# Patient Record
Sex: FEMALE | Race: WHITE | Employment: FULL TIME | URBAN - METROPOLITAN AREA
[De-identification: names, ages, dates, MRNs, and addresses within clinical notes are randomized per-mention and may not be internally consistent; named-entity substitution may affect disease eponyms.]

---

## 2017-06-30 ENCOUNTER — OFFICE VISIT (OUTPATIENT)
Dept: OBGYN CLINIC | Age: 52
End: 2017-06-30
Payer: COMMERCIAL

## 2017-06-30 ENCOUNTER — HOSPITAL ENCOUNTER (OUTPATIENT)
Age: 52
Setting detail: SPECIMEN
Discharge: HOME | End: 2017-06-30
Payer: COMMERCIAL

## 2017-06-30 VITALS
HEART RATE: 78 BPM | WEIGHT: 140 LBS | HEIGHT: 67 IN | BODY MASS INDEX: 21.97 KG/M2 | DIASTOLIC BLOOD PRESSURE: 76 MMHG | SYSTOLIC BLOOD PRESSURE: 118 MMHG

## 2017-06-30 DIAGNOSIS — Z80.3 FAMILY HISTORY OF BREAST CANCER: ICD-10-CM

## 2017-06-30 DIAGNOSIS — Z11.51 SPECIAL SCREENING EXAMINATION FOR HUMAN PAPILLOMAVIRUS (HPV): ICD-10-CM

## 2017-06-30 DIAGNOSIS — Z98.890 H/O LEEP: ICD-10-CM

## 2017-06-30 DIAGNOSIS — Z01.419 WELL FEMALE EXAM WITH ROUTINE GYNECOLOGICAL EXAM: ICD-10-CM

## 2017-06-30 DIAGNOSIS — Z01.419 WELL FEMALE EXAM WITH ROUTINE GYNECOLOGICAL EXAM: Primary | ICD-10-CM

## 2017-06-30 PROBLEM — Z96.643 H/O BILATERAL HIP REPLACEMENTS: Status: ACTIVE | Noted: 2017-06-30

## 2017-06-30 PROCEDURE — 99386 PREV VISIT NEW AGE 40-64: CPT | Performed by: NURSE PRACTITIONER

## 2017-06-30 PROCEDURE — 87624 HPV HI-RISK TYP POOLED RSLT: CPT

## 2017-06-30 PROCEDURE — G0145 SCR C/V CYTO,THINLAYER,RESCR: HCPCS

## 2017-06-30 RX ORDER — LEVOTHYROXINE SODIUM 0.07 MG/1
75 TABLET ORAL DAILY
COMMUNITY

## 2017-06-30 ASSESSMENT — PATIENT HEALTH QUESTIONNAIRE - PHQ9
1. LITTLE INTEREST OR PLEASURE IN DOING THINGS: 0
2. FEELING DOWN, DEPRESSED OR HOPELESS: 0
SUM OF ALL RESPONSES TO PHQ9 QUESTIONS 1 & 2: 0
SUM OF ALL RESPONSES TO PHQ QUESTIONS 1-9: 0

## 2017-07-03 LAB
HPV SAMPLE: NORMAL
HPV SOURCE: NORMAL
HPV, GENOTYPE 16: NOT DETECTED
HPV, GENOTYPE 18: NOT DETECTED
HPV, HIGH RISK OTHER: NOT DETECTED
HPV, INTERPRETATION: NORMAL

## 2017-07-06 LAB — CYTOLOGY REPORT: NORMAL

## 2017-07-07 ENCOUNTER — HOSPITAL ENCOUNTER (OUTPATIENT)
Dept: WOMENS IMAGING | Age: 52
Discharge: HOME | End: 2017-07-07
Payer: COMMERCIAL

## 2017-07-07 DIAGNOSIS — Z13.9 SCREENING: ICD-10-CM

## 2017-07-07 DIAGNOSIS — Z78.0 POST-MENOPAUSAL: ICD-10-CM

## 2017-07-07 PROCEDURE — 77080 DXA BONE DENSITY AXIAL: CPT

## 2017-07-07 PROCEDURE — 77063 BREAST TOMOSYNTHESIS BI: CPT

## 2018-07-06 ENCOUNTER — OFFICE VISIT (OUTPATIENT)
Dept: OBGYN CLINIC | Age: 53
End: 2018-07-06
Payer: COMMERCIAL

## 2018-07-06 ENCOUNTER — HOSPITAL ENCOUNTER (OUTPATIENT)
Age: 53
Discharge: HOME | End: 2018-07-06
Payer: COMMERCIAL

## 2018-07-06 ENCOUNTER — HOSPITAL ENCOUNTER (OUTPATIENT)
Age: 53
Setting detail: SPECIMEN
Discharge: HOME | End: 2018-07-06
Payer: COMMERCIAL

## 2018-07-06 VITALS
HEIGHT: 67 IN | SYSTOLIC BLOOD PRESSURE: 116 MMHG | DIASTOLIC BLOOD PRESSURE: 68 MMHG | HEART RATE: 80 BPM | BODY MASS INDEX: 22.76 KG/M2 | WEIGHT: 145 LBS

## 2018-07-06 DIAGNOSIS — N95.1 MENOPAUSAL SYMPTOM: ICD-10-CM

## 2018-07-06 DIAGNOSIS — Z11.51 SPECIAL SCREENING EXAMINATION FOR HUMAN PAPILLOMAVIRUS (HPV): ICD-10-CM

## 2018-07-06 DIAGNOSIS — Z01.419 WELL WOMAN EXAM: ICD-10-CM

## 2018-07-06 DIAGNOSIS — Z12.39 BREAST CANCER SCREENING: ICD-10-CM

## 2018-07-06 DIAGNOSIS — N93.9 VAGINAL BLEEDING: ICD-10-CM

## 2018-07-06 DIAGNOSIS — Z01.419 WELL WOMAN EXAM: Primary | ICD-10-CM

## 2018-07-06 LAB
ESTRADIOL LEVEL: 159 PG/ML (ref 5–50)
FOLLICLE STIMULATING HORMONE: 7.7 U/L (ref 25.8–134.8)

## 2018-07-06 PROCEDURE — G0145 SCR C/V CYTO,THINLAYER,RESCR: HCPCS

## 2018-07-06 PROCEDURE — 87624 HPV HI-RISK TYP POOLED RSLT: CPT

## 2018-07-06 PROCEDURE — 36415 COLL VENOUS BLD VENIPUNCTURE: CPT

## 2018-07-06 PROCEDURE — 82670 ASSAY OF TOTAL ESTRADIOL: CPT

## 2018-07-06 PROCEDURE — 83001 ASSAY OF GONADOTROPIN (FSH): CPT

## 2018-07-06 PROCEDURE — 99396 PREV VISIT EST AGE 40-64: CPT | Performed by: NURSE PRACTITIONER

## 2018-07-06 ASSESSMENT — PATIENT HEALTH QUESTIONNAIRE - PHQ9
SUM OF ALL RESPONSES TO PHQ9 QUESTIONS 1 & 2: 0
1. LITTLE INTEREST OR PLEASURE IN DOING THINGS: 0
2. FEELING DOWN, DEPRESSED OR HOPELESS: 0
SUM OF ALL RESPONSES TO PHQ QUESTIONS 1-9: 0

## 2018-07-06 NOTE — PROGRESS NOTES
3. Menopausal symptom  Estradiol    Follicle Stimulating Hormone   4. Vaginal bleeding  Estradiol    Follicle Stimulating Hormone   5. Special screening examination for human papillomavirus (HPV)  PAP SMEAR          Chief Complaint   Patient presents with    Gynecologic Exam          Past Medical History:   Diagnosis Date    Family history of breast cancer     MOM @ 61 Sister @ 48    H/O LEEP 1996    Hypothyroid     Kidney stones          Patient Active Problem List    Diagnosis Date Noted    H/O right hip replacement 06/30/2017 2014      Hypothyroid     Family history of breast cancer      MOM @ 61 Sister @ 48      H/O LEEP 01/01/1996          Hereditary Breast, Ovarian, Colon and Uterine Cancer screening Done. Tobacco & Secondary smoke risks reviewed; instructed on cessation and avoidance      Counseling Completed:  Preventative Health Recommendations and Follow up. The patient was informed of the recommended preventative health recommendations. 1. Annuals every year; Cytology collections per prevailing guidelines. 2. Mammograms begin every year at 35 yo if no abnormalities are found and no family     History. 3. Bone density studies every 2-3 years. Begin at 71 yo. If no fracture history or osteoporosis family history. (significant). 4. Colonoscopy begin at 38 yo. Repeat every ten years if negative and no family history. 5. Calcium of 1896-3213 mg/day in split dosing  6. Vitamin D 400-800 IU/day  7. All other preventative health recommendations will be managed by the patients Primary care physician. PLAN:  Return in about 1 year (around 7/6/2019). Declines [facility] genetic testing. Information given. Mammogram ordered. Lab slips given to check menopausal status. Call for results. Pap smear obtained. Repeat Annual every 1 year  Cervical Cytology Evaluation begins at 24years old. If Negative Cytology, Follow-up screening per current guidelines.    Mammograms

## 2018-07-09 ENCOUNTER — TELEPHONE (OUTPATIENT)
Dept: OBGYN CLINIC | Age: 53
End: 2018-07-09

## 2018-07-13 ENCOUNTER — HOSPITAL ENCOUNTER (OUTPATIENT)
Dept: WOMENS IMAGING | Age: 53
Discharge: HOME | End: 2018-07-15
Payer: COMMERCIAL

## 2018-07-13 DIAGNOSIS — Z12.39 BREAST CANCER SCREENING: ICD-10-CM

## 2018-07-13 DIAGNOSIS — Z01.419 WELL WOMAN EXAM: ICD-10-CM

## 2018-07-13 PROCEDURE — 77067 SCR MAMMO BI INCL CAD: CPT

## 2018-07-19 LAB — CYTOLOGY REPORT: NORMAL

## 2019-07-08 ENCOUNTER — OFFICE VISIT (OUTPATIENT)
Dept: OBGYN CLINIC | Age: 54
End: 2019-07-08
Payer: COMMERCIAL

## 2019-07-08 ENCOUNTER — HOSPITAL ENCOUNTER (OUTPATIENT)
Age: 54
Setting detail: SPECIMEN
Discharge: HOME | End: 2019-07-08
Payer: COMMERCIAL

## 2019-07-08 VITALS
HEIGHT: 66 IN | HEART RATE: 78 BPM | BODY MASS INDEX: 24.27 KG/M2 | WEIGHT: 151 LBS | SYSTOLIC BLOOD PRESSURE: 104 MMHG | DIASTOLIC BLOOD PRESSURE: 68 MMHG

## 2019-07-08 DIAGNOSIS — Z12.31 ENCOUNTER FOR SCREENING MAMMOGRAM FOR BREAST CANCER: ICD-10-CM

## 2019-07-08 DIAGNOSIS — N92.0 MENORRHAGIA WITH REGULAR CYCLE: ICD-10-CM

## 2019-07-08 DIAGNOSIS — Z11.51 SCREENING FOR HUMAN PAPILLOMAVIRUS: ICD-10-CM

## 2019-07-08 DIAGNOSIS — Z01.419 PAP SMEAR, AS PART OF ROUTINE GYNECOLOGICAL EXAMINATION: ICD-10-CM

## 2019-07-08 DIAGNOSIS — Z80.3 FAMILY HISTORY OF BREAST CANCER: ICD-10-CM

## 2019-07-08 DIAGNOSIS — Z01.419 PAP SMEAR, AS PART OF ROUTINE GYNECOLOGICAL EXAMINATION: Primary | ICD-10-CM

## 2019-07-08 LAB
ABSOLUTE EOS #: 0.1 K/UL (ref 0–0.4)
ABSOLUTE IMMATURE GRANULOCYTE: ABNORMAL K/UL (ref 0–0.3)
ABSOLUTE LYMPH #: 1.4 K/UL (ref 1–4.8)
ABSOLUTE MONO #: 0.5 K/UL (ref 0.1–1.3)
BASOPHILS # BLD: 1 % (ref 0–2)
BASOPHILS ABSOLUTE: 0 K/UL (ref 0–0.2)
DIFFERENTIAL TYPE: ABNORMAL
EOSINOPHILS RELATIVE PERCENT: 3 % (ref 0–4)
ESTRADIOL LEVEL: 293 PG/ML (ref 5–50)
FOLLICLE STIMULATING HORMONE: 4.5 U/L (ref 25.8–134.8)
HCG QUANTITATIVE: <1 IU/L
HCT VFR BLD CALC: 39.8 % (ref 36–46)
HEMOGLOBIN: 13.4 G/DL (ref 12–16)
IMMATURE GRANULOCYTES: ABNORMAL %
INR BLD: 0.9
LYMPHOCYTES # BLD: 27 % (ref 24–44)
MCH RBC QN AUTO: 31.8 PG (ref 26–34)
MCHC RBC AUTO-ENTMCNC: 33.7 G/DL (ref 31–37)
MCV RBC AUTO: 94.5 FL (ref 80–100)
MONOCYTES # BLD: 9 % (ref 1–7)
NRBC AUTOMATED: ABNORMAL PER 100 WBC
PARTIAL THROMBOPLASTIN TIME: 29.5 SEC (ref 24–36)
PDW BLD-RTO: 13.9 % (ref 11.5–14.9)
PLATELET # BLD: 247 K/UL (ref 150–450)
PLATELET ESTIMATE: ABNORMAL
PMV BLD AUTO: 10.1 FL (ref 6–12)
PROTHROMBIN TIME: 12.6 SEC (ref 11.8–14.6)
RBC # BLD: 4.21 M/UL (ref 4–5.2)
RBC # BLD: ABNORMAL 10*6/UL
SEG NEUTROPHILS: 60 % (ref 36–66)
SEGMENTED NEUTROPHILS ABSOLUTE COUNT: 3.2 K/UL (ref 1.3–9.1)
TSH SERPL DL<=0.05 MIU/L-ACNC: 3.66 MIU/L (ref 0.3–5)
WBC # BLD: 5.3 K/UL (ref 3.5–11)
WBC # BLD: ABNORMAL 10*3/UL

## 2019-07-08 PROCEDURE — 99396 PREV VISIT EST AGE 40-64: CPT | Performed by: NURSE PRACTITIONER

## 2019-07-08 PROCEDURE — 84702 CHORIONIC GONADOTROPIN TEST: CPT

## 2019-07-08 PROCEDURE — 36415 COLL VENOUS BLD VENIPUNCTURE: CPT

## 2019-07-08 PROCEDURE — 82670 ASSAY OF TOTAL ESTRADIOL: CPT

## 2019-07-08 PROCEDURE — 84443 ASSAY THYROID STIM HORMONE: CPT

## 2019-07-08 PROCEDURE — 85610 PROTHROMBIN TIME: CPT

## 2019-07-08 PROCEDURE — 85025 COMPLETE CBC W/AUTO DIFF WBC: CPT

## 2019-07-08 PROCEDURE — 87624 HPV HI-RISK TYP POOLED RSLT: CPT

## 2019-07-08 PROCEDURE — 85730 THROMBOPLASTIN TIME PARTIAL: CPT

## 2019-07-08 PROCEDURE — 83001 ASSAY OF GONADOTROPIN (FSH): CPT

## 2019-07-08 PROCEDURE — G0145 SCR C/V CYTO,THINLAYER,RESCR: HCPCS

## 2019-07-08 ASSESSMENT — PATIENT HEALTH QUESTIONNAIRE - PHQ9
1. LITTLE INTEREST OR PLEASURE IN DOING THINGS: 0
2. FEELING DOWN, DEPRESSED OR HOPELESS: 0
SUM OF ALL RESPONSES TO PHQ9 QUESTIONS 1 & 2: 0
SUM OF ALL RESPONSES TO PHQ QUESTIONS 1-9: 0
SUM OF ALL RESPONSES TO PHQ QUESTIONS 1-9: 0

## 2019-07-08 NOTE — PROGRESS NOTES
Hematuria or Nocturia. No Urinary Incontinence or Vaginal Discharge. + heavy, painful menses  Musculoskeletal ROS: No Arthralgia, Arthritis,Gout,Osteoporosis or Rheumatism  Neurological ROS: No CVA, Migraines, Epilepsy, Seizure Hx, or Limb Weakness. + hypothyroidism  Dermatological ROS: No Rash, Itching, Hives, Mole Changes or Cancer                                                                                                                                                                                                                                  PHYSICAL Exam:     Constitutional:  Vitals:    07/08/19 0805   BP: 104/68   Site: Right Upper Arm   Position: Sitting   Cuff Size: Medium Adult   Pulse: 78   Weight: 151 lb (68.5 kg)   Height: 5' 6\" (1.676 m)         General Appearance: This  is a well Developed, well Nourished, well groomed female. Her BMI was reviewed. Nutritional decision making was discussed. Skin:  There was a Normal Inspection of the skin without rashes or lesions. There were no rashes. (Papular, Maculopapular, Hives, Pustular, Macular)     There were no lesions (Ulcers, Erythema, Abn. Appearing Nevi)            Lymphatic:  No Lymph Nodes were Palpable in the neck , axilla or groin.  0 # Of Lymph Nodes; Location ; Character [Normal]  [Shotty] [Tender] [Enlarged]     Neck and EENT:  The neck was supple. There were no masses   The thyroid was not enlarged and had no masses. Perrla, EOMI B/L, TMI B/L No Abnormalities. Throat inspected-No exudates or Masses, Nares Patent No Masses        Respiratory: The lungs were auscultated and found to be clear. There were no rales, rhonchi or wheezes. There was a good respiratory effort. Cardiovascular: The heart was in a regular rate and rhythm. . No S3 or S4. There was no murmur appreciated. Location, grade, and radiation are not applicable. Extremities:   The patients extremities were without calf tenderness, edema, or varicosities. There was full range of motion in all four extremities. Pulses in all four extremities were appreciated and are 2/4. Abdomen: The abdomen was soft and non-tender. There were good bowel sounds in all quadrants and there was no guarding, rebound or rigidity. On evaluation there was no evidence of hepatosplenomegaly and there was no costal vertebral marquita tenderness bilaterally. No hernias were appreciated. Abdominal Scars: intact    Psych: The patient had a normal Orientation to: Time, Place, Person, and Situation  There is no Mood / Affect changes    Breast:  (Chest)  normal appearance, no masses or tenderness  Self breast exams were reviewed in detail. Literature was given. Pelvic Exam:  Vulva and vagina appear normal. Bimanual exam reveals normal uterus and adnexa. Rectal Exam:  exam declined by patient          Musculosk:  Normal Gait and station was noted. Digits were evaluated without abnormal findings. Range of motion, stability and strength were evaluated and found to be appropriate for the patients age. ASSESSMENT:      62 y.o. Annual   Diagnosis Orders   1. Pap smear, as part of routine gynecological examination  PAP SMEAR   2. Encounter for screening mammogram for breast cancer  MALIA DIGITAL SCREEN W CAD BILATERAL   3. Screening for human papillomavirus  PAP SMEAR   4. Menorrhagia with regular cycle  US Pelvis Complete    US Non OB Transvaginal    CBC Auto Differential    TSH with Reflex    HCG, Quantitative, Pregnancy    Protime-INR    APTT    Follicle Stimulating Hormone    Estradiol   5.  Family history of breast cancer  [address], [facility]          Chief Complaint   Patient presents with    Annual Exam          Past Medical History:   Diagnosis Date    Family history of breast cancer     MOM @ 61 Sister @ 48    H/O LEECONOR 1996    Hypothyroid     Kidney stones          Patient Active Problem List    Diagnosis Date Noted    H/O onset of her antibiotic dosing and through a minimum of thirty days from antibiotic cessation. The life threatening side effect profile was reviewed in detail this includes but is not limited to shortness of breath, chest pain, severe or persistent headaches, or calf pain. If any of these occur the patient has been instructed to stop using her hormonal based contraception, notify the office, and go to the emergency department or call 911. The patient denied any personal history of blood clots in her leg, lung, or heart and denied any family history of stroke, TIA, sudden cardiac death < 36 y.o.,pulmonary embolism, or deep venous thrombosis. PLAN:  Return in about 1 month (around 8/5/2019) for physician/ heavy menses. Pap smear obtained. Mammogram ordered. Pelvic ultrasound ordered. Lab slips given. Repeat Annual every 1 year  Cervical Cytology Evaluation begins at 24years old. If Negative Cytology, Follow-up screening per current guidelines. Mammograms every 1 year. If 35 yo and last mammogram was negative. Calcium and Vitamin D dosing reviewed. Colonoscopy screening reviewed as well as onset for bone density testing. Birth control and barrier recommendations discussed. STD counseling and prevention reviewed. Gardisil counseling completed for all patients 10-35 yo. Routine health maintenance per patients PCP.   Orders Placed This Encounter   Procedures    MALIA DIGITAL SCREEN W CAD BILATERAL     Standing Status:   Future     Standing Expiration Date:   9/4/2020     Order Specific Question:   Reason for exam:     Answer:   screening for breast cancer    US Pelvis Complete     Standing Status:   Future     Standing Expiration Date:   10/8/2019     Order Specific Question:   Reason for exam:     Answer:   heavy menses    US Non OB Transvaginal     Standing Status:   Future     Standing Expiration Date:   1/8/2020     Order Specific Question:   Reason for exam:     Answer:   heavy periods   

## 2019-07-11 LAB
CYTOLOGY REPORT: NORMAL
HPV SAMPLE: NORMAL
HPV, GENOTYPE 16: NOT DETECTED
HPV, GENOTYPE 18: NOT DETECTED
HPV, HIGH RISK OTHER: NOT DETECTED
HPV, INTERPRETATION: NORMAL
SPECIMEN DESCRIPTION: NORMAL

## 2019-07-15 ENCOUNTER — OFFICE VISIT (OUTPATIENT)
Dept: OBGYN CLINIC | Age: 54
End: 2019-07-15
Payer: COMMERCIAL

## 2019-07-15 DIAGNOSIS — N92.0 MENORRHAGIA WITH REGULAR CYCLE: ICD-10-CM

## 2019-07-15 PROCEDURE — 76856 US EXAM PELVIC COMPLETE: CPT | Performed by: OBSTETRICS & GYNECOLOGY

## 2019-07-16 ENCOUNTER — HOSPITAL ENCOUNTER (OUTPATIENT)
Dept: WOMENS IMAGING | Age: 54
Discharge: HOME | End: 2019-07-18
Payer: COMMERCIAL

## 2019-07-16 DIAGNOSIS — Z12.31 ENCOUNTER FOR SCREENING MAMMOGRAM FOR BREAST CANCER: ICD-10-CM

## 2019-07-16 PROCEDURE — 77063 BREAST TOMOSYNTHESIS BI: CPT

## 2019-07-17 ENCOUNTER — TELEPHONE (OUTPATIENT)
Dept: OBGYN CLINIC | Age: 54
End: 2019-07-17

## 2019-07-17 DIAGNOSIS — R92.2 DENSE BREAST TISSUE ON MAMMOGRAM: ICD-10-CM

## 2019-07-17 DIAGNOSIS — Z12.39 BREAST CANCER SCREENING, HIGH RISK PATIENT: Primary | ICD-10-CM

## 2019-07-17 NOTE — TELEPHONE ENCOUNTER
----- Message from LEON Zapien CNP sent at 7/17/2019  4:08 PM EDT -----  Thickened endometrial lining. Suspected fibroid on uterus. Recommend GYN follow up with endometrial sampling with directed visualization- possible myosure resection. Please let patient know results and encourage patient to keep follow up with physician.

## 2019-07-17 NOTE — TELEPHONE ENCOUNTER
Pt notified of normal mammogram.  Patient advised to do MRI of breasts in 6 months due to high risk status. Order in [address] Patient also agreeable to referral to high risk Breast Clinic for genetic counseling and oncology consultation due to Tyrer-Cuzick risk score of 22.9%. Referral to high risk breast clinic given to be done by  staff. Patient to notify our office if she has not been contacted by high risk breast clinic in one week.   Pt verbalized understanding of above orders and was aggreable to plan of care

## 2019-07-22 ENCOUNTER — HOSPITAL ENCOUNTER (OUTPATIENT)
Dept: WOMENS IMAGING | Age: 54
Discharge: HOME | End: 2019-07-24
Payer: COMMERCIAL

## 2019-07-22 DIAGNOSIS — Z78.0 POST-MENOPAUSAL: ICD-10-CM

## 2019-07-22 PROCEDURE — 77080 DXA BONE DENSITY AXIAL: CPT

## 2019-07-23 DIAGNOSIS — Z80.3 FAMILY HISTORY OF BREAST CANCER: Primary | ICD-10-CM

## 2019-07-23 DIAGNOSIS — R92.2 DENSE BREAST TISSUE: ICD-10-CM

## 2019-08-06 ENCOUNTER — INITIAL CONSULT (OUTPATIENT)
Dept: ONCOLOGY | Age: 54
End: 2019-08-06
Payer: COMMERCIAL

## 2019-08-06 ENCOUNTER — OFFICE VISIT (OUTPATIENT)
Dept: OBGYN CLINIC | Age: 54
End: 2019-08-06
Payer: COMMERCIAL

## 2019-08-06 ENCOUNTER — TELEPHONE (OUTPATIENT)
Dept: ONCOLOGY | Age: 54
End: 2019-08-06

## 2019-08-06 VITALS
WEIGHT: 145.1 LBS | TEMPERATURE: 97.8 F | DIASTOLIC BLOOD PRESSURE: 69 MMHG | HEIGHT: 66 IN | SYSTOLIC BLOOD PRESSURE: 112 MMHG | RESPIRATION RATE: 18 BRPM | HEART RATE: 57 BPM | BODY MASS INDEX: 23.32 KG/M2

## 2019-08-06 VITALS
HEIGHT: 66 IN | HEART RATE: 60 BPM | SYSTOLIC BLOOD PRESSURE: 112 MMHG | BODY MASS INDEX: 23.3 KG/M2 | WEIGHT: 145 LBS | DIASTOLIC BLOOD PRESSURE: 60 MMHG

## 2019-08-06 DIAGNOSIS — R10.2 PELVIC PRESSURE IN FEMALE: ICD-10-CM

## 2019-08-06 DIAGNOSIS — D21.9 FIBROIDS: ICD-10-CM

## 2019-08-06 DIAGNOSIS — Z80.3 FAMILY HISTORY OF BREAST CANCER: ICD-10-CM

## 2019-08-06 DIAGNOSIS — R93.89 ENDOMETRIAL THICKENING ON ULTRASOUND: ICD-10-CM

## 2019-08-06 DIAGNOSIS — R10.2 PELVIC PAIN IN FEMALE: ICD-10-CM

## 2019-08-06 DIAGNOSIS — N92.0 MENORRHAGIA WITH REGULAR CYCLE: Primary | ICD-10-CM

## 2019-08-06 DIAGNOSIS — Z80.3 FAMILY HISTORY OF BREAST CANCER: Primary | ICD-10-CM

## 2019-08-06 DIAGNOSIS — N85.2 BULKY OR ENLARGED UTERUS: ICD-10-CM

## 2019-08-06 PROCEDURE — 99214 OFFICE O/P EST MOD 30 MIN: CPT | Performed by: OBSTETRICS & GYNECOLOGY

## 2019-08-06 PROCEDURE — 99244 OFF/OP CNSLTJ NEW/EST MOD 40: CPT | Performed by: INTERNAL MEDICINE

## 2019-08-06 PROCEDURE — 96040 PR GENETIC COUNSELING, EACH 30 MIN: CPT | Performed by: GENETIC COUNSELOR, MS

## 2019-08-06 PROCEDURE — 99201 HC NEW PT, E/M LEVEL 1: CPT | Performed by: INTERNAL MEDICINE

## 2019-08-06 NOTE — PROGRESS NOTES
genetic testing should be offered to their children. DISCUSSION  We discussed that the BRCA1/2 genes are the most common genes associated with hereditary breast and ovarian cancer. We also discussed that genetic testing is available for multiple other genes related to hereditary cancer. Some of these genes are known to carry a significant increased risk for several cancers including colon, breast, uterine, ovarian, stomach, and pancreatic cancer, while some of these genes are believed to have a moderate increased risk for breast and other cancers. We discussed the possibility of finding a mutation in genes with limited information to guide medical management, as well we as the possibility of identifying variants of uncertain significance (VUS). We discussed the risks, benefits, and limitations of genetic testing. Possible test results were discussed as well as potential screening and prevention strategies. Specifically, we discussed increased breast cancer surveillance by mammogram and breast MRI as well as the option of prophylactic mastectomy. We discussed the recommendation for prophylactic oophorectomy for results which suggest an increased risk for ovarian cancer. Lastly, we discussed that the results of Ms. Sanchez's genetic testing may be beneficial in defining her risk for cancer as well as for her family members. SUMMARY & PLAN  1) Ms. Mandi Esqueda does not currently meet the NCCN criteria or her insurance criteria for genetic testing. 2) Genetic testing for the BRCA1/2 genes along with other genes associated with hereditary cancer was offered to Ms. Mandi Esqueda. Since testing would not be covered by insurance, it would be offered to her for a discounted self pay rate. Ms. Mandi Esqueda declines genetic testing at this time. 3) Ms. Mandi Esqueda will continue breast cancer screening and risk reduction as directed by her physicians.  Her lifetime risk for breast cancer was estimated at 22.9% according to

## 2019-08-06 NOTE — PROGRESS NOTES
follow-up is recommended in 12 months. OVERALL ASSESSMENT - BENIGN A letter of notification will be sent to the patient regarding the results. The [employer] of Radiology recommends annual mammograms for women 40 years and older. Lab Results:  Results for orders placed or performed during the hospital encounter of 07/08/19   Estradiol   Result Value Ref Range    Estradiol 293 (H) 5 - 50 pg/mL   Follicle Stimulating Hormone   Result Value Ref Range    FSH 4.5 (L) 25.8 - 134.8 U/L   APTT   Result Value Ref Range    PTT 29.5 24.0 - 36.0 sec   Protime-INR   Result Value Ref Range    Protime 12.6 11.8 - 14.6 sec    INR 0.9    HCG, Quantitative, Pregnancy   Result Value Ref Range    hCG Quant <1 <5 IU/L   TSH with Reflex   Result Value Ref Range    TSH 3.66 0.30 - 5.00 mIU/L   CBC Auto Differential   Result Value Ref Range    WBC 5.3 3.5 - 11.0 k/uL    RBC 4.21 4.0 - 5.2 m/uL    Hemoglobin 13.4 12.0 - 16.0 g/dL    Hematocrit 39.8 36 - 46 %    MCV 94.5 80 - 100 fL    MCH 31.8 26 - 34 pg    MCHC 33.7 31 - 37 g/dL    RDW 13.9 11.5 - 14.9 %    Platelets 328 174 - 897 k/uL    MPV 10.1 6.0 - 12.0 fL    NRBC Automated NOT REPORTED per 100 WBC    Differential Type NOT REPORTED     Seg Neutrophils 60 36 - 66 %    Lymphocytes 27 24 - 44 %    Monocytes 9 (H) 1 - 7 %    Eosinophils % 3 0 - 4 %    Basophils 1 0 - 2 %    Immature Granulocytes NOT REPORTED 0 %    Segs Absolute 3.20 1.3 - 9.1 k/uL    Absolute Lymph # 1.40 1.0 - 4.8 k/uL    Absolute Mono # 0.50 0.1 - 1.3 k/uL    Absolute Eos # 0.10 0.0 - 0.4 k/uL    Basophils # 0.00 0.0 - 0.2 k/uL    Absolute Immature Granulocyte NOT REPORTED 0.00 - 0.30 k/uL    WBC Morphology NOT REPORTED     RBC Morphology NOT REPORTED     Platelet Estimate NOT REPORTED    GYN Cytology   Result Value Ref Range    Cytology Report       [id number]  [employer]  [facility] [place]  ANATOMIC PATHOLOGY  [address] [place] [address],  [address].   [employer], [address]  [phone]  Fax: encounter diagnosis was Menorrhagia with regular cycle. Diagnoses of Family history of breast cancer, Fibroids, Pelvic pressure in female, Pelvic pain in female, Endometrial thickening on ultrasound, and Bulky or enlarged uterus were also pertinent to this visit. and Follow-up   as well as  counseling on preventative health maintenance follow-up.

## 2019-08-06 NOTE — LETTER
Marquis Chau MD    2019     [place] [address] [address]    Dear Doctors : Thank you for referring Martinez Walden, 1965, to me for evaluation. Below are the relevant portions of my assessment and plan of care. BRIEF CASE HISTORY: Martinez Walden is a very pleasant 62 y.o. female who is referred to us for consultation for the high risk  breast cancer clinic. She is evaluated by myself and the genetic counselor. She does not have any personal history of breast cancer but she has significant family history. She underwent screening mammogram and during the screen, she took the [employer] high risk questionnaire. The questionnaire showed that her lifetime risk of breast cancer is 22.9%, which falls into the high risk category. She is referred to us for counseling, discussion of risk reduction modalities as well as genetic evaluation. She had breast lump removed from the right breast in  that was benign. She had yearly breast exam 2019 and it was negative. She has hypothyroidism to which she takes Synthroid. GYN history   Menarche: 11  Menopause age:  Still premenopausal.   HRT  None     4  Para 4  Breast Bx one in  (benign)    Age of first life birth  30  PAST MEDICAL HISTORY: has a past medical history of Family history of breast cancer, H/O LEEP, Hypothyroid, and Kidney stones. PAST SURGICAL HISTORY: has a past surgical history that includes joint replacement (Right); LEEP; and Breast lumpectomy (Right, ). CURRENT MEDICATIONS:  has a current medication list which includes the following prescription(s): calcium and levothyroxine. ALLERGIES:  has No Known Allergies. FAMILY HISTORY: detailed family history was obtained, and reviewed, it is detailed in the genetic counselor note. SOCIAL HISTORY:  reports that she quit smoking about 35 years ago.  Her Based on the [employer] [place]) model, this patient's lifetime risk for   developing breast cancer is 22.9%.  The American Cancer society considers   women with a 20% or greater lifetime risk for developing breast cancer as   \"high risk\". Women at high risk may benefit from additional screening, which   may include an annual screening mammogram alternating every six (6) months   with a breast MRI, as appropriate.       Recommend this patient consult with her preferred provider about: a Genetic   Counseling Consultation to discuss formal risk assessment; and a Medical   Oncology Consultation to discuss risk reduction strategies. Assistance, if   requested, is available through the [address]   at [phone] or by placing an [facility] Ambulatory referral to the [facility]   Breast Clinic\".       BIRADS:   BIRADS - CATEGORY 2         IMPRESSION:   1. Elevated lifetime risk of breast cancer based on the [address], life time risk in 22.9%  2. History of hypothyroidism  PLAN:   I had a very long discussion with the patient, explaining the [place] model and the risk factor that led to hair falling into the high risk category. Lifetime risk of developing breast cancer in average [facility] woman ranges between 10% and 12%. So her lifetime risk is almost double the normal.   Various governing bodies in the oncology world have determined that lifetime risk of breast cancer above 20% constitutes a group of women who are eligible for enhanced surveillance and should be counseled about genetic breast cancer.   Also guidelines determined that this group of women are eligible for breast cancer reduction modalities such as the use of tamoxifen based on the NSABP study of tamoxifen and raloxifene and breast cancer (STAR trial) her tamoxifen was able to decrease the discomfort risk cancer by 50%  Side effects of tamoxifen were explained in detail including hot flashes,

## 2019-08-22 ENCOUNTER — PROCEDURE VISIT (OUTPATIENT)
Dept: OBGYN CLINIC | Age: 54
End: 2019-08-22
Payer: COMMERCIAL

## 2019-08-22 ENCOUNTER — HOSPITAL ENCOUNTER (OUTPATIENT)
Age: 54
Setting detail: SPECIMEN
Discharge: HOME | End: 2019-08-22
Payer: COMMERCIAL

## 2019-08-22 VITALS
HEART RATE: 69 BPM | BODY MASS INDEX: 22.66 KG/M2 | WEIGHT: 141 LBS | DIASTOLIC BLOOD PRESSURE: 74 MMHG | HEIGHT: 66 IN | SYSTOLIC BLOOD PRESSURE: 128 MMHG

## 2019-08-22 DIAGNOSIS — N85.2 BULKY OR ENLARGED UTERUS: ICD-10-CM

## 2019-08-22 DIAGNOSIS — Z80.3 FAMILY HISTORY OF BREAST CANCER: ICD-10-CM

## 2019-08-22 DIAGNOSIS — Z32.02 NEGATIVE PREGNANCY TEST: ICD-10-CM

## 2019-08-22 DIAGNOSIS — R10.2 PELVIC PRESSURE IN FEMALE: ICD-10-CM

## 2019-08-22 DIAGNOSIS — N92.0 MENORRHAGIA WITH REGULAR CYCLE: ICD-10-CM

## 2019-08-22 DIAGNOSIS — R93.89 ENDOMETRIAL THICKENING ON ULTRASOUND: Primary | ICD-10-CM

## 2019-08-22 DIAGNOSIS — R10.2 PELVIC PAIN IN FEMALE: ICD-10-CM

## 2019-08-22 DIAGNOSIS — Z98.890 H/O LEEP: ICD-10-CM

## 2019-08-22 DIAGNOSIS — D21.9 FIBROIDS: ICD-10-CM

## 2019-08-22 LAB
CONTROL: NORMAL
PREGNANCY TEST URINE, POC: NEGATIVE

## 2019-08-22 PROCEDURE — 58558 HYSTEROSCOPY BIOPSY: CPT | Performed by: OBSTETRICS & GYNECOLOGY

## 2019-08-22 PROCEDURE — 88305 TISSUE EXAM BY PATHOLOGIST: CPT

## 2019-08-22 PROCEDURE — 81025 URINE PREGNANCY TEST: CPT | Performed by: OBSTETRICS & GYNECOLOGY

## 2019-08-26 LAB — SURGICAL PATHOLOGY REPORT: NORMAL

## 2019-08-27 ENCOUNTER — TELEPHONE (OUTPATIENT)
Dept: OBGYN CLINIC | Age: 54
End: 2019-08-27

## 2019-08-27 NOTE — TELEPHONE ENCOUNTER
----- Message from Delta LEON Akins - CNP sent at 8/26/2019  5:35 PM EDT -----  Negative endometrial biopsy. According to Dr Solomon Interiano notes, patient considering total abdominal hyst.  Please review with DR Ella Ayala and Tere Perdue to see if surgery date is set and scheduled patient for follow up accordingly.

## 2019-11-19 ENCOUNTER — PREP FOR PROCEDURE (OUTPATIENT)
Dept: OBGYN CLINIC | Age: 54
End: 2019-11-19

## 2019-11-19 ENCOUNTER — OFFICE VISIT (OUTPATIENT)
Dept: OBGYN CLINIC | Age: 54
End: 2019-11-19
Payer: COMMERCIAL

## 2019-11-19 VITALS
HEART RATE: 67 BPM | WEIGHT: 145 LBS | SYSTOLIC BLOOD PRESSURE: 100 MMHG | HEIGHT: 67 IN | BODY MASS INDEX: 22.76 KG/M2 | DIASTOLIC BLOOD PRESSURE: 70 MMHG

## 2019-11-19 DIAGNOSIS — N92.0 MENORRHAGIA WITH REGULAR CYCLE: Primary | ICD-10-CM

## 2019-11-19 DIAGNOSIS — D21.9 FIBROIDS: ICD-10-CM

## 2019-11-19 DIAGNOSIS — Z98.890 H/O LEEP: ICD-10-CM

## 2019-11-19 DIAGNOSIS — N85.2 BULKY OR ENLARGED UTERUS: ICD-10-CM

## 2019-11-19 DIAGNOSIS — E03.9 ACQUIRED HYPOTHYROIDISM: ICD-10-CM

## 2019-11-19 DIAGNOSIS — R10.2 PELVIC PAIN IN FEMALE: ICD-10-CM

## 2019-11-19 DIAGNOSIS — N94.10 DYSPAREUNIA, FEMALE: ICD-10-CM

## 2019-11-19 DIAGNOSIS — Z01.818 PREOP TESTING: Primary | ICD-10-CM

## 2019-11-19 DIAGNOSIS — R10.2 PELVIC PRESSURE IN FEMALE: ICD-10-CM

## 2019-11-19 PROCEDURE — 99213 OFFICE O/P EST LOW 20 MIN: CPT | Performed by: OBSTETRICS & GYNECOLOGY

## 2019-11-19 RX ORDER — SODIUM CHLORIDE, SODIUM LACTATE, POTASSIUM CHLORIDE, CALCIUM CHLORIDE 600; 310; 30; 20 MG/100ML; MG/100ML; MG/100ML; MG/100ML
INJECTION, SOLUTION INTRAVENOUS CONTINUOUS
Status: CANCELLED | OUTPATIENT
Start: 2019-11-19

## 2019-11-19 RX ORDER — SODIUM CHLORIDE 0.9 % (FLUSH) 0.9 %
10 SYRINGE (ML) INJECTION EVERY 12 HOURS SCHEDULED
Status: CANCELLED | OUTPATIENT
Start: 2019-11-19

## 2019-11-19 RX ORDER — SODIUM CHLORIDE 0.9 % (FLUSH) 0.9 %
10 SYRINGE (ML) INJECTION PRN
Status: CANCELLED | OUTPATIENT
Start: 2019-11-19

## 2019-11-19 NOTE — H&P (VIEW-ONLY)
Allergies)           REVIEW OF SYSTEMS:      General appearance:Appears healthy. Alert; in no acute distress. Pleasant. HEENT: Glasses or contacts no  CARDIOVASCULAR: Denies: chest pain, dyspnea on exertion, palpitations  RESPIRATORY: Denies: cough, shortness of breath, wheezing  GI: Denies: abdominal pain, flank pain, nausea, vomiting, diarrhea  : Denies: dysuria, frequency/urgency, hematuria, pelvic pain  MUSCULOSKELETAL: Denies: back pain, joint swelling, muscle pain  SKIN: Denies: rash, hives. HEMATOLOGIC: Denies Bleeding Disorder, Coagulopathy or Transfusion  NEUROLOGIC: Denies Migraines, Headaches, CVA, TIA  ENDOCRINE: +HYPOTHROIDISM ON MEDS                PHYSICAL EXAM:  Blood pressure 100/70, pulse 67, height 5' 7\" (1.702 m), weight 145 lb (65.8 kg), not currently breastfeeding. General Appearance:  awake, alert, oriented, in no acute distress, well developed, well nourished, in no acute distress   Skin:  Skin color, texture, turgor normal. No rashes or lesions  Mouth/Throat:  Mucosa moist.  No lesions. Pharynx without erythema, edema or exudate. Lungs:  Normal expansion. Clear to auscultation. No rales, rhonchi, or wheezing. Heart:  Heart sounds are normal.  Regular rate and rhythm without murmur, gallop or rub. Breast:  Inspection negative. No nipple discharge or bleeding. No palpable mass  Abdomen:  Soft, non-tender, normal bowel sounds. No bruits, organomegaly or masses. Extremities: Extremities warm to touch, pink, with no edema. Musculoskeletal:  + Right hip replacement  Peripheral Pulses:  Normal  Neurologic:  Gait normal. Reflexes normal and symmetric. Sensation grossly intact. Female Urogen:  declined   Female Rectal: Declined    OMM Structural Component:  The patient did not complain of a Chief complaint requiring OMM.   Chief Complaint:none    Structural Exam: No Interest              Diagnostics:  Us Non Ob Transvaginal     Result Date: 7/15/2019  GYNECOLOGY ULTRASOUND REPORT mIU/L   CBC Auto Differential   Result Value Ref Range     WBC 5.3 3.5 - 11.0 k/uL     RBC 4.21 4.0 - 5.2 m/uL     Hemoglobin 13.4 12.0 - 16.0 g/dL     Hematocrit 39.8 36 - 46 %     MCV 94.5 80 - 100 fL     MCH 31.8 26 - 34 pg     MCHC 33.7 31 - 37 g/dL     RDW 13.9 11.5 - 14.9 %     Platelets 590 928 - 750 k/uL     MPV 10.1 6.0 - 12.0 fL     NRBC Automated NOT REPORTED per 100 WBC     Differential Type NOT REPORTED       Seg Neutrophils 60 36 - 66 %     Lymphocytes 27 24 - 44 %     Monocytes 9 (H) 1 - 7 %     Eosinophils % 3 0 - 4 %     Basophils 1 0 - 2 %     Immature Granulocytes NOT REPORTED 0 %     Segs Absolute 3.20 1.3 - 9.1 k/uL     Absolute Lymph # 1.40 1.0 - 4.8 k/uL     Absolute Mono # 0.50 0.1 - 1.3 k/uL     Absolute Eos # 0.10 0.0 - 0.4 k/uL     Basophils # 0.00 0.0 - 0.2 k/uL     Absolute Immature Granulocyte NOT REPORTED 0.00 - 0.30 k/uL     WBC Morphology NOT REPORTED       RBC Morphology NOT REPORTED       Platelet Estimate NOT REPORTED     GYN Cytology   Result Value Ref Range     Cytology Report           [id number]  [facility]  [facility]  ANATOMIC PATHOLOGY  [address] [place] [address]. [place], [address]  [phone]  Fax: [phone]  GYNECOLOGIC CYTOLOGY REPORT     Patient Name: Salvador Fierro  MR#: [id number]  Specimen #[id number]  Source:  1: Cervical material, (ThinPrep vial, Imaging-assisted review)     Clinical History  LEEP  Z01.419 Routine gyn exam without abnormal findings  Z11.51 Encounter for screening for HPV  Co-Test:  ThinPrep Pap with high risk HPV testing  LMP:  6/27/19  INTERPRETATION     Cervical material, (ThinPrep vial, Imaging-assisted review):  Specimen Adequacy:       Satisfactory for evaluation.       -Endocervical/transformation zone component is absent.   Descriptive Diagnosis:       Negative for intraepithelial lesion or malignancy.   Comments:       High Risk HPV testing was ordered.        Cytotechnologist: Nathaniel Baer [phone]  SURGICAL PATHOLOGY REPORT    Patient Name: Leia Blue  MR#: [id number]  Specimen #[id number]       Final Diagnosis  ENDOMETRIUM, BIOPSY:         WEAKLY PROLIFERATIVE/INACTIVE-TYPE ENDOMETRIUM    FRAGMENTS OF BENIGN ENDOCERVICAL MUCOSA      Jesus Jones. Nico Kendrick D.O.  **Electronically Signed Out**         [facility]/8/26/2019    Clinical Information  Endometrium thickening on ultrasound. No formalin time on surgical  slip. Source:  A: Endometrial biopsy    Gross Description  Received in formalin labeled \"EMB\" are portions of pink soft tissue  and mucoid material aggregating to 2 x 1.2 x 0.3 cm. The specimen is  entirely submitted in a single cassette. Microscopic Description  Microscopic examination of 2 H&E slides confirms the diagnosis. Assessment:     Diagnosis Orders   1. Menorrhagia with regular cycle     2. Fibroids     3. Pelvic pressure in female     4. Pelvic pain in female     5. Bulky or enlarged uterus     6. H/O LEEP     7. Acquired hypothyroidism     8. Dyspareunia, female         Patient Active Problem List    Diagnosis Date Noted    H/O right hip replacement 06/30/2017     Overview Note:     2014      Hypothyroid     Family history of breast cancer      Overview Note:     MOM @ 61 Sister @ 48      H/O LEEP 01/01/1996             Plan:  1. ANDREY BSO   2. Possible Enterocele        No orders of the defined types were placed in this encounter. Counseling: The patient was counseled on all options both medical and surgical, conservative as well as definitive. She has elected to proceed with the procedure as stated above. The patient was counseled on the procedure. Risks and complications were reviewed in detail. The patients orders, labs, consents have been completed. The history and physical as well as all supporting surgical documentation will be forwarded to the pre-operative holding area.     The patient is aware that this procedure may not alleviate her symptoms. That there may be a necessity for a second surgery and that there may be an incomplete removal of abnormal tissue.                    ________________________________________D. O.  Date:_______________  Adam Aguilar DO

## 2019-11-25 ENCOUNTER — HOSPITAL ENCOUNTER (OUTPATIENT)
Dept: PREADMISSION TESTING | Age: 54
Discharge: HOME | End: 2019-11-29
Payer: COMMERCIAL

## 2019-11-25 VITALS
BODY MASS INDEX: 22.76 KG/M2 | WEIGHT: 145 LBS | DIASTOLIC BLOOD PRESSURE: 60 MMHG | HEIGHT: 67 IN | HEART RATE: 66 BPM | SYSTOLIC BLOOD PRESSURE: 102 MMHG | RESPIRATION RATE: 16 BRPM | TEMPERATURE: 97.9 F | OXYGEN SATURATION: 100 %

## 2019-11-25 DIAGNOSIS — Z01.818 PREOP TESTING: ICD-10-CM

## 2019-11-25 PROBLEM — Z67.11 TYPE A BLOOD, RH NEGATIVE: Status: ACTIVE | Noted: 2019-11-25

## 2019-11-25 LAB
-: ABNORMAL
ABO/RH: NORMAL
ABSOLUTE BANDS #: 0.05 K/UL (ref 0–1)
ABSOLUTE EOS #: 0 K/UL (ref 0–0.4)
ABSOLUTE IMMATURE GRANULOCYTE: ABNORMAL K/UL (ref 0–0.3)
ABSOLUTE LYMPH #: 0.15 K/UL (ref 1–4.8)
ABSOLUTE MONO #: 0.31 K/UL (ref 0.1–1.3)
AMORPHOUS: ABNORMAL
ANION GAP SERPL CALCULATED.3IONS-SCNC: 10 MMOL/L (ref 9–17)
ANTIBODY SCREEN: NEGATIVE
ARM BAND NUMBER: NORMAL
BACTERIA: ABNORMAL
BANDS: 1 % (ref 0–10)
BASOPHILS # BLD: 0 % (ref 0–2)
BASOPHILS ABSOLUTE: 0 K/UL (ref 0–0.2)
BILIRUBIN URINE: NEGATIVE
BLOOD BANK COMMENT: NORMAL
BUN BLDV-MCNC: 10 MG/DL (ref 6–20)
BUN/CREAT BLD: ABNORMAL (ref 9–20)
CALCIUM SERPL-MCNC: 8.3 MG/DL (ref 8.6–10.4)
CASTS UA: ABNORMAL /LPF
CHLORIDE BLD-SCNC: 105 MMOL/L (ref 98–107)
CO2: 22 MMOL/L (ref 20–31)
COLOR: YELLOW
COMMENT UA: ABNORMAL
CREAT SERPL-MCNC: 0.43 MG/DL (ref 0.5–0.9)
CRYSTALS, UA: ABNORMAL /HPF
DIFFERENTIAL TYPE: ABNORMAL
EOSINOPHILS RELATIVE PERCENT: 0 % (ref 0–4)
EPITHELIAL CELLS UA: ABNORMAL /HPF
EXPIRATION DATE: NORMAL
GFR AFRICAN AMERICAN: >60 ML/MIN
GFR NON-AFRICAN AMERICAN: >60 ML/MIN
GFR SERPL CREATININE-BSD FRML MDRD: ABNORMAL ML/MIN/{1.73_M2}
GFR SERPL CREATININE-BSD FRML MDRD: ABNORMAL ML/MIN/{1.73_M2}
GLUCOSE BLD-MCNC: 92 MG/DL (ref 70–99)
GLUCOSE URINE: NEGATIVE
HCG QUANTITATIVE: <1 IU/L
HCT VFR BLD CALC: 39 % (ref 36–46)
HEMOGLOBIN: 13.3 G/DL (ref 12–16)
IMMATURE GRANULOCYTES: ABNORMAL %
KETONES, URINE: NEGATIVE
LEUKOCYTE ESTERASE, URINE: NEGATIVE
LYMPHOCYTES # BLD: 3 % (ref 24–44)
MCH RBC QN AUTO: 31.9 PG (ref 26–34)
MCHC RBC AUTO-ENTMCNC: 34.2 G/DL (ref 31–37)
MCV RBC AUTO: 93.2 FL (ref 80–100)
MONOCYTES # BLD: 6 % (ref 1–7)
MORPHOLOGY: NORMAL
MUCUS: ABNORMAL
NITRITE, URINE: NEGATIVE
NRBC AUTOMATED: ABNORMAL PER 100 WBC
OTHER OBSERVATIONS UA: ABNORMAL
PDW BLD-RTO: 13.6 % (ref 11.5–14.9)
PH UA: 6.5 (ref 5–8)
PLATELET # BLD: 211 K/UL (ref 150–450)
PLATELET ESTIMATE: ABNORMAL
PMV BLD AUTO: 8.8 FL (ref 6–12)
POTASSIUM SERPL-SCNC: 4.6 MMOL/L (ref 3.7–5.3)
PROTEIN UA: NEGATIVE
RBC # BLD: 4.18 M/UL (ref 4–5.2)
RBC # BLD: ABNORMAL 10*6/UL
RBC UA: ABNORMAL /HPF
RENAL EPITHELIAL, UA: ABNORMAL /HPF
SEG NEUTROPHILS: 90 % (ref 36–66)
SEGMENTED NEUTROPHILS ABSOLUTE COUNT: 4.59 K/UL (ref 1.3–9.1)
SODIUM BLD-SCNC: 137 MMOL/L (ref 135–144)
SPECIFIC GRAVITY UA: 1 (ref 1–1.03)
TRICHOMONAS: ABNORMAL
TURBIDITY: CLEAR
URINE HGB: ABNORMAL
UROBILINOGEN, URINE: NORMAL
WBC # BLD: 5.1 K/UL (ref 3.5–11)
WBC # BLD: ABNORMAL 10*3/UL
WBC UA: ABNORMAL /HPF
YEAST: ABNORMAL

## 2019-11-25 PROCEDURE — 86900 BLOOD TYPING SEROLOGIC ABO: CPT

## 2019-11-25 PROCEDURE — 86901 BLOOD TYPING SEROLOGIC RH(D): CPT

## 2019-11-25 PROCEDURE — 86850 RBC ANTIBODY SCREEN: CPT

## 2019-11-25 PROCEDURE — 85025 COMPLETE CBC W/AUTO DIFF WBC: CPT

## 2019-11-25 PROCEDURE — 84702 CHORIONIC GONADOTROPIN TEST: CPT

## 2019-11-25 PROCEDURE — 80048 BASIC METABOLIC PNL TOTAL CA: CPT

## 2019-11-25 PROCEDURE — 87086 URINE CULTURE/COLONY COUNT: CPT

## 2019-11-25 PROCEDURE — 36415 COLL VENOUS BLD VENIPUNCTURE: CPT

## 2019-11-25 PROCEDURE — 93005 ELECTROCARDIOGRAM TRACING: CPT | Performed by: ANESTHESIOLOGY

## 2019-11-25 PROCEDURE — 81001 URINALYSIS AUTO W/SCOPE: CPT

## 2019-11-25 ASSESSMENT — PAIN DESCRIPTION - PAIN TYPE: TYPE: ACUTE PAIN

## 2019-11-25 ASSESSMENT — PAIN DESCRIPTION - DESCRIPTORS: DESCRIPTORS: CRAMPING

## 2019-11-25 ASSESSMENT — PAIN SCALES - GENERAL: PAINLEVEL_OUTOF10: 2

## 2019-11-25 ASSESSMENT — PAIN DESCRIPTION - LOCATION: LOCATION: ABDOMEN

## 2019-11-26 ENCOUNTER — ANESTHESIA EVENT (OUTPATIENT)
Dept: OPERATING ROOM | Age: 54
DRG: 743 | End: 2019-11-26
Payer: COMMERCIAL

## 2019-11-26 LAB
CULTURE: NORMAL
EKG ATRIAL RATE: 60 BPM
EKG P AXIS: 55 DEGREES
EKG P-R INTERVAL: 132 MS
EKG Q-T INTERVAL: 434 MS
EKG QRS DURATION: 92 MS
EKG QTC CALCULATION (BAZETT): 434 MS
EKG R AXIS: 68 DEGREES
EKG T AXIS: 48 DEGREES
EKG VENTRICULAR RATE: 60 BPM
Lab: NORMAL
SPECIMEN DESCRIPTION: NORMAL

## 2019-11-26 PROCEDURE — 93010 ELECTROCARDIOGRAM REPORT: CPT | Performed by: INTERNAL MEDICINE

## 2019-11-26 RX ORDER — SODIUM CHLORIDE 0.9 % (FLUSH) 0.9 %
10 SYRINGE (ML) INJECTION PRN
Status: CANCELLED | OUTPATIENT
Start: 2019-11-26

## 2019-11-26 RX ORDER — SODIUM CHLORIDE 0.9 % (FLUSH) 0.9 %
10 SYRINGE (ML) INJECTION EVERY 12 HOURS SCHEDULED
Status: CANCELLED | OUTPATIENT
Start: 2019-11-26

## 2019-11-26 RX ORDER — LIDOCAINE HYDROCHLORIDE 10 MG/ML
1 INJECTION, SOLUTION EPIDURAL; INFILTRATION; INTRACAUDAL; PERINEURAL
Status: CANCELLED | OUTPATIENT
Start: 2019-11-26 | End: 2019-11-26

## 2019-11-26 RX ORDER — SODIUM CHLORIDE, SODIUM LACTATE, POTASSIUM CHLORIDE, CALCIUM CHLORIDE 600; 310; 30; 20 MG/100ML; MG/100ML; MG/100ML; MG/100ML
INJECTION, SOLUTION INTRAVENOUS CONTINUOUS
Status: CANCELLED | OUTPATIENT
Start: 2019-11-26

## 2019-12-02 ENCOUNTER — HOSPITAL ENCOUNTER (INPATIENT)
Age: 54
LOS: 2 days | Discharge: HOME | DRG: 743 | End: 2019-12-04
Attending: OBSTETRICS & GYNECOLOGY | Admitting: OBSTETRICS & GYNECOLOGY
Payer: COMMERCIAL

## 2019-12-02 ENCOUNTER — ANESTHESIA (OUTPATIENT)
Dept: OPERATING ROOM | Age: 54
DRG: 743 | End: 2019-12-02
Payer: COMMERCIAL

## 2019-12-02 VITALS — DIASTOLIC BLOOD PRESSURE: 57 MMHG | TEMPERATURE: 97 F | SYSTOLIC BLOOD PRESSURE: 119 MMHG | OXYGEN SATURATION: 98 %

## 2019-12-02 PROBLEM — D25.1 INTRAMURAL AND SUBSEROUS LEIOMYOMA OF UTERUS: Status: ACTIVE | Noted: 2019-12-02

## 2019-12-02 PROBLEM — Z90.79 S/P TAH-BSO (TOTAL ABDOMINAL HYSTERECTOMY AND BILATERAL SALPINGO-OOPHORECTOMY): Status: ACTIVE | Noted: 2019-12-02

## 2019-12-02 PROBLEM — R10.2 PELVIC PRESSURE IN FEMALE: Status: ACTIVE | Noted: 2019-12-02

## 2019-12-02 PROBLEM — Z90.710 S/P TAH-BSO (TOTAL ABDOMINAL HYSTERECTOMY AND BILATERAL SALPINGO-OOPHORECTOMY): Status: ACTIVE | Noted: 2019-12-02

## 2019-12-02 PROBLEM — D25.2 INTRAMURAL AND SUBSEROUS LEIOMYOMA OF UTERUS: Status: ACTIVE | Noted: 2019-12-02

## 2019-12-02 PROBLEM — Z98.890 POSTOPERATIVE STATE: Status: ACTIVE | Noted: 2019-12-02

## 2019-12-02 PROBLEM — Z90.722 S/P TAH-BSO (TOTAL ABDOMINAL HYSTERECTOMY AND BILATERAL SALPINGO-OOPHORECTOMY): Status: ACTIVE | Noted: 2019-12-02

## 2019-12-02 PROBLEM — N94.6 DYSMENORRHEA: Status: ACTIVE | Noted: 2019-12-02

## 2019-12-02 PROBLEM — N85.2 BULKY OR ENLARGED UTERUS: Status: ACTIVE | Noted: 2019-12-02

## 2019-12-02 PROBLEM — N92.0 MENORRHAGIA WITH REGULAR CYCLE: Status: ACTIVE | Noted: 2019-12-02

## 2019-12-02 PROBLEM — N81.5 ACQUIRED PELVIC ENTEROCELE: Status: ACTIVE | Noted: 2019-12-02

## 2019-12-02 LAB
-: NORMAL
BILIRUBIN URINE: NEGATIVE
COLOR: YELLOW
COMMENT UA: NORMAL
GLUCOSE URINE: NEGATIVE
HCG, PREGNANCY URINE (POC): NEGATIVE
KETONES, URINE: NEGATIVE
LEUKOCYTE ESTERASE, URINE: NEGATIVE
NITRITE, URINE: NEGATIVE
PH UA: 7 (ref 5–8)
PROTEIN UA: NEGATIVE
SPECIFIC GRAVITY UA: 1.01 (ref 1–1.03)
TURBIDITY: CLEAR
URINE HGB: NEGATIVE
UROBILINOGEN, URINE: NORMAL

## 2019-12-02 PROCEDURE — 1220000000 HC SEMI PRIVATE OB R&B

## 2019-12-02 PROCEDURE — 88305 TISSUE EXAM BY PATHOLOGIST: CPT

## 2019-12-02 PROCEDURE — 57270 REPAIR OF BOWEL POUCH: CPT | Performed by: OBSTETRICS & GYNECOLOGY

## 2019-12-02 PROCEDURE — 2580000003 HC RX 258: Performed by: STUDENT IN AN ORGANIZED HEALTH CARE EDUCATION/TRAINING PROGRAM

## 2019-12-02 PROCEDURE — 6360000002 HC RX W HCPCS

## 2019-12-02 PROCEDURE — 2709999900 HC NON-CHARGEABLE SUPPLY: Performed by: OBSTETRICS & GYNECOLOGY

## 2019-12-02 PROCEDURE — 2500000003 HC RX 250 WO HCPCS: Performed by: OBSTETRICS & GYNECOLOGY

## 2019-12-02 PROCEDURE — 6360000002 HC RX W HCPCS: Performed by: ANESTHESIOLOGY

## 2019-12-02 PROCEDURE — 0UQF0ZZ REPAIR CUL-DE-SAC, OPEN APPROACH: ICD-10-PCS | Performed by: OBSTETRICS & GYNECOLOGY

## 2019-12-02 PROCEDURE — 0UT90ZZ RESECTION OF UTERUS, OPEN APPROACH: ICD-10-PCS | Performed by: OBSTETRICS & GYNECOLOGY

## 2019-12-02 PROCEDURE — 3700000000 HC ANESTHESIA ATTENDED CARE: Performed by: OBSTETRICS & GYNECOLOGY

## 2019-12-02 PROCEDURE — 58150 TOTAL HYSTERECTOMY: CPT | Performed by: OBSTETRICS & GYNECOLOGY

## 2019-12-02 PROCEDURE — 0UT20ZZ RESECTION OF BILATERAL OVARIES, OPEN APPROACH: ICD-10-PCS | Performed by: OBSTETRICS & GYNECOLOGY

## 2019-12-02 PROCEDURE — 7100000000 HC PACU RECOVERY - FIRST 15 MIN: Performed by: OBSTETRICS & GYNECOLOGY

## 2019-12-02 PROCEDURE — 0UT70ZZ RESECTION OF BILATERAL FALLOPIAN TUBES, OPEN APPROACH: ICD-10-PCS | Performed by: OBSTETRICS & GYNECOLOGY

## 2019-12-02 PROCEDURE — 88307 TISSUE EXAM BY PATHOLOGIST: CPT

## 2019-12-02 PROCEDURE — 81025 URINE PREGNANCY TEST: CPT

## 2019-12-02 PROCEDURE — 6360000002 HC RX W HCPCS: Performed by: OBSTETRICS & GYNECOLOGY

## 2019-12-02 PROCEDURE — 6370000000 HC RX 637 (ALT 250 FOR IP): Performed by: OBSTETRICS & GYNECOLOGY

## 2019-12-02 PROCEDURE — 81003 URINALYSIS AUTO W/O SCOPE: CPT

## 2019-12-02 PROCEDURE — 2580000003 HC RX 258: Performed by: OBSTETRICS & GYNECOLOGY

## 2019-12-02 PROCEDURE — 2580000003 HC RX 258: Performed by: ANESTHESIOLOGY

## 2019-12-02 PROCEDURE — G0378 HOSPITAL OBSERVATION PER HR: HCPCS

## 2019-12-02 PROCEDURE — 7100000001 HC PACU RECOVERY - ADDTL 15 MIN: Performed by: OBSTETRICS & GYNECOLOGY

## 2019-12-02 PROCEDURE — 3700000001 HC ADD 15 MINUTES (ANESTHESIA): Performed by: OBSTETRICS & GYNECOLOGY

## 2019-12-02 PROCEDURE — 6360000002 HC RX W HCPCS: Performed by: STUDENT IN AN ORGANIZED HEALTH CARE EDUCATION/TRAINING PROGRAM

## 2019-12-02 PROCEDURE — 2580000003 HC RX 258

## 2019-12-02 PROCEDURE — 2500000003 HC RX 250 WO HCPCS

## 2019-12-02 PROCEDURE — 3600000002 HC SURGERY LEVEL 2 BASE: Performed by: OBSTETRICS & GYNECOLOGY

## 2019-12-02 PROCEDURE — 3600000012 HC SURGERY LEVEL 2 ADDTL 15MIN: Performed by: OBSTETRICS & GYNECOLOGY

## 2019-12-02 PROCEDURE — 6370000000 HC RX 637 (ALT 250 FOR IP): Performed by: STUDENT IN AN ORGANIZED HEALTH CARE EDUCATION/TRAINING PROGRAM

## 2019-12-02 RX ORDER — MORPHINE SULFATE 2 MG/ML
2 INJECTION, SOLUTION INTRAMUSCULAR; INTRAVENOUS
Status: DISCONTINUED | OUTPATIENT
Start: 2019-12-02 | End: 2019-12-02

## 2019-12-02 RX ORDER — SODIUM CHLORIDE 0.9 % (FLUSH) 0.9 %
10 SYRINGE (ML) INJECTION EVERY 12 HOURS SCHEDULED
Status: DISCONTINUED | OUTPATIENT
Start: 2019-12-02 | End: 2019-12-02 | Stop reason: HOSPADM

## 2019-12-02 RX ORDER — MIDAZOLAM HYDROCHLORIDE 1 MG/ML
INJECTION INTRAMUSCULAR; INTRAVENOUS PRN
Status: DISCONTINUED | OUTPATIENT
Start: 2019-12-02 | End: 2019-12-02 | Stop reason: SDUPTHER

## 2019-12-02 RX ORDER — LEVOTHYROXINE SODIUM 0.07 MG/1
75 TABLET ORAL DAILY
Status: DISCONTINUED | OUTPATIENT
Start: 2019-12-03 | End: 2019-12-04 | Stop reason: HOSPADM

## 2019-12-02 RX ORDER — SODIUM CHLORIDE 0.9 % (FLUSH) 0.9 %
10 SYRINGE (ML) INJECTION PRN
Status: DISCONTINUED | OUTPATIENT
Start: 2019-12-02 | End: 2019-12-02 | Stop reason: HOSPADM

## 2019-12-02 RX ORDER — ONDANSETRON 2 MG/ML
4 INJECTION INTRAMUSCULAR; INTRAVENOUS EVERY 6 HOURS PRN
Status: DISCONTINUED | OUTPATIENT
Start: 2019-12-02 | End: 2019-12-04 | Stop reason: HOSPADM

## 2019-12-02 RX ORDER — SODIUM CHLORIDE 0.9 % (FLUSH) 0.9 %
10 SYRINGE (ML) INJECTION PRN
Status: DISCONTINUED | OUTPATIENT
Start: 2019-12-02 | End: 2019-12-04 | Stop reason: HOSPADM

## 2019-12-02 RX ORDER — MORPHINE SULFATE 1 MG/ML
INJECTION, SOLUTION EPIDURAL; INTRATHECAL; INTRAVENOUS PRN
Status: DISCONTINUED | OUTPATIENT
Start: 2019-12-02 | End: 2019-12-02 | Stop reason: SDUPTHER

## 2019-12-02 RX ORDER — NALOXONE HYDROCHLORIDE 0.4 MG/ML
0.4 INJECTION, SOLUTION INTRAMUSCULAR; INTRAVENOUS; SUBCUTANEOUS PRN
Status: DISCONTINUED | OUTPATIENT
Start: 2019-12-02 | End: 2019-12-02

## 2019-12-02 RX ORDER — SODIUM CHLORIDE 9 MG/ML
INJECTION, SOLUTION INTRAVENOUS CONTINUOUS
Status: DISCONTINUED | OUTPATIENT
Start: 2019-12-02 | End: 2019-12-04 | Stop reason: HOSPADM

## 2019-12-02 RX ORDER — ONDANSETRON 2 MG/ML
INJECTION INTRAMUSCULAR; INTRAVENOUS PRN
Status: DISCONTINUED | OUTPATIENT
Start: 2019-12-02 | End: 2019-12-02 | Stop reason: SDUPTHER

## 2019-12-02 RX ORDER — NALBUPHINE HCL 10 MG/ML
5 AMPUL (ML) INJECTION EVERY 4 HOURS PRN
Status: DISCONTINUED | OUTPATIENT
Start: 2019-12-02 | End: 2019-12-02

## 2019-12-02 RX ORDER — FENTANYL CITRATE 50 UG/ML
INJECTION, SOLUTION INTRAMUSCULAR; INTRAVENOUS PRN
Status: DISCONTINUED | OUTPATIENT
Start: 2019-12-02 | End: 2019-12-02 | Stop reason: SDUPTHER

## 2019-12-02 RX ORDER — CLINDAMYCIN PHOSPHATE 900 MG/50ML
900 INJECTION INTRAVENOUS ONCE
Status: COMPLETED | OUTPATIENT
Start: 2019-12-02 | End: 2019-12-02

## 2019-12-02 RX ORDER — MIDAZOLAM HYDROCHLORIDE 1 MG/ML
2 INJECTION INTRAMUSCULAR; INTRAVENOUS ONCE
Status: COMPLETED | OUTPATIENT
Start: 2019-12-02 | End: 2019-12-02

## 2019-12-02 RX ORDER — MORPHINE SULFATE 2 MG/ML
2 INJECTION, SOLUTION INTRAMUSCULAR; INTRAVENOUS EVERY 5 MIN PRN
Status: DISCONTINUED | OUTPATIENT
Start: 2019-12-02 | End: 2019-12-02 | Stop reason: HOSPADM

## 2019-12-02 RX ORDER — DIPHENHYDRAMINE HYDROCHLORIDE 50 MG/ML
12.5 INJECTION INTRAMUSCULAR; INTRAVENOUS
Status: DISCONTINUED | OUTPATIENT
Start: 2019-12-02 | End: 2019-12-02 | Stop reason: HOSPADM

## 2019-12-02 RX ORDER — OXYCODONE HYDROCHLORIDE AND ACETAMINOPHEN 5; 325 MG/1; MG/1
1 TABLET ORAL EVERY 4 HOURS PRN
Status: DISCONTINUED | OUTPATIENT
Start: 2019-12-02 | End: 2019-12-04 | Stop reason: HOSPADM

## 2019-12-02 RX ORDER — KETOROLAC TROMETHAMINE 30 MG/ML
30 INJECTION, SOLUTION INTRAMUSCULAR; INTRAVENOUS EVERY 6 HOURS
Status: COMPLETED | OUTPATIENT
Start: 2019-12-02 | End: 2019-12-03

## 2019-12-02 RX ORDER — NEOSTIGMINE METHYLSULFATE 5 MG/5 ML
SYRINGE (ML) INTRAVENOUS PRN
Status: DISCONTINUED | OUTPATIENT
Start: 2019-12-02 | End: 2019-12-02 | Stop reason: SDUPTHER

## 2019-12-02 RX ORDER — ROCURONIUM BROMIDE 10 MG/ML
INJECTION, SOLUTION INTRAVENOUS PRN
Status: DISCONTINUED | OUTPATIENT
Start: 2019-12-02 | End: 2019-12-02 | Stop reason: SDUPTHER

## 2019-12-02 RX ORDER — MORPHINE SULFATE 4 MG/ML
4 INJECTION, SOLUTION INTRAMUSCULAR; INTRAVENOUS
Status: DISCONTINUED | OUTPATIENT
Start: 2019-12-02 | End: 2019-12-02

## 2019-12-02 RX ORDER — PROPOFOL 10 MG/ML
INJECTION, EMULSION INTRAVENOUS PRN
Status: DISCONTINUED | OUTPATIENT
Start: 2019-12-02 | End: 2019-12-02 | Stop reason: SDUPTHER

## 2019-12-02 RX ORDER — HEPARIN SODIUM 5000 [USP'U]/ML
5000 INJECTION, SOLUTION INTRAVENOUS; SUBCUTANEOUS EVERY 12 HOURS
Status: DISCONTINUED | OUTPATIENT
Start: 2019-12-02 | End: 2019-12-04 | Stop reason: HOSPADM

## 2019-12-02 RX ORDER — 0.9 % SODIUM CHLORIDE 0.9 %
500 INTRAVENOUS SOLUTION INTRAVENOUS ONCE
Status: COMPLETED | OUTPATIENT
Start: 2019-12-02 | End: 2019-12-02

## 2019-12-02 RX ORDER — ONDANSETRON 2 MG/ML
4 INJECTION INTRAMUSCULAR; INTRAVENOUS EVERY 6 HOURS PRN
Status: DISCONTINUED | OUTPATIENT
Start: 2019-12-02 | End: 2019-12-02

## 2019-12-02 RX ORDER — DEXAMETHASONE SODIUM PHOSPHATE 4 MG/ML
INJECTION, SOLUTION INTRA-ARTICULAR; INTRALESIONAL; INTRAMUSCULAR; INTRAVENOUS; SOFT TISSUE PRN
Status: DISCONTINUED | OUTPATIENT
Start: 2019-12-02 | End: 2019-12-02 | Stop reason: SDUPTHER

## 2019-12-02 RX ORDER — SODIUM CHLORIDE, SODIUM LACTATE, POTASSIUM CHLORIDE, CALCIUM CHLORIDE 600; 310; 30; 20 MG/100ML; MG/100ML; MG/100ML; MG/100ML
INJECTION, SOLUTION INTRAVENOUS CONTINUOUS
Status: DISCONTINUED | OUTPATIENT
Start: 2019-12-02 | End: 2019-12-02

## 2019-12-02 RX ORDER — EPHEDRINE SULFATE/0.9% NACL/PF 50 MG/5 ML
SYRINGE (ML) INTRAVENOUS PRN
Status: DISCONTINUED | OUTPATIENT
Start: 2019-12-02 | End: 2019-12-02 | Stop reason: SDUPTHER

## 2019-12-02 RX ORDER — SODIUM CHLORIDE 0.9 % (FLUSH) 0.9 %
10 SYRINGE (ML) INJECTION EVERY 12 HOURS SCHEDULED
Status: DISCONTINUED | OUTPATIENT
Start: 2019-12-02 | End: 2019-12-04 | Stop reason: HOSPADM

## 2019-12-02 RX ORDER — GLYCOPYRROLATE 1 MG/5 ML
SYRINGE (ML) INTRAVENOUS PRN
Status: DISCONTINUED | OUTPATIENT
Start: 2019-12-02 | End: 2019-12-02 | Stop reason: SDUPTHER

## 2019-12-02 RX ORDER — MEPERIDINE HYDROCHLORIDE 25 MG/ML
12.5 INJECTION INTRAMUSCULAR; INTRAVENOUS; SUBCUTANEOUS EVERY 5 MIN PRN
Status: DISCONTINUED | OUTPATIENT
Start: 2019-12-02 | End: 2019-12-02 | Stop reason: HOSPADM

## 2019-12-02 RX ORDER — LIDOCAINE HYDROCHLORIDE 10 MG/ML
1 INJECTION, SOLUTION EPIDURAL; INFILTRATION; INTRACAUDAL; PERINEURAL
Status: DISCONTINUED | OUTPATIENT
Start: 2019-12-02 | End: 2019-12-02 | Stop reason: HOSPADM

## 2019-12-02 RX ORDER — DOCUSATE SODIUM 100 MG/1
100 CAPSULE, LIQUID FILLED ORAL 2 TIMES DAILY
Status: DISCONTINUED | OUTPATIENT
Start: 2019-12-02 | End: 2019-12-04 | Stop reason: HOSPADM

## 2019-12-02 RX ORDER — ACETAMINOPHEN 500 MG
1000 TABLET ORAL EVERY 6 HOURS PRN
COMMUNITY
End: 2021-10-06

## 2019-12-02 RX ORDER — ACETAMINOPHEN 325 MG/1
650 TABLET ORAL EVERY 4 HOURS PRN
Status: DISCONTINUED | OUTPATIENT
Start: 2019-12-02 | End: 2019-12-04 | Stop reason: HOSPADM

## 2019-12-02 RX ORDER — LABETALOL 20 MG/4 ML (5 MG/ML) INTRAVENOUS SYRINGE
5 EVERY 10 MIN PRN
Status: DISCONTINUED | OUTPATIENT
Start: 2019-12-02 | End: 2019-12-02 | Stop reason: HOSPADM

## 2019-12-02 RX ORDER — BUPIVACAINE HYDROCHLORIDE 5 MG/ML
INJECTION, SOLUTION EPIDURAL; INTRACAUDAL PRN
Status: DISCONTINUED | OUTPATIENT
Start: 2019-12-02 | End: 2019-12-02 | Stop reason: ALTCHOICE

## 2019-12-02 RX ORDER — MIDAZOLAM HYDROCHLORIDE 1 MG/ML
INJECTION INTRAMUSCULAR; INTRAVENOUS
Status: COMPLETED
Start: 2019-12-02 | End: 2019-12-02

## 2019-12-02 RX ORDER — KETOROLAC TROMETHAMINE 30 MG/ML
INJECTION, SOLUTION INTRAMUSCULAR; INTRAVENOUS PRN
Status: DISCONTINUED | OUTPATIENT
Start: 2019-12-02 | End: 2019-12-02 | Stop reason: SDUPTHER

## 2019-12-02 RX ORDER — PROMETHAZINE HYDROCHLORIDE 25 MG/ML
12.5 INJECTION, SOLUTION INTRAMUSCULAR; INTRAVENOUS EVERY 6 HOURS PRN
Status: DISCONTINUED | OUTPATIENT
Start: 2019-12-02 | End: 2019-12-04 | Stop reason: HOSPADM

## 2019-12-02 RX ORDER — SODIUM CHLORIDE, SODIUM LACTATE, POTASSIUM CHLORIDE, CALCIUM CHLORIDE 600; 310; 30; 20 MG/100ML; MG/100ML; MG/100ML; MG/100ML
INJECTION, SOLUTION INTRAVENOUS CONTINUOUS PRN
Status: DISCONTINUED | OUTPATIENT
Start: 2019-12-02 | End: 2019-12-02 | Stop reason: SDUPTHER

## 2019-12-02 RX ORDER — ONDANSETRON 2 MG/ML
4 INJECTION INTRAMUSCULAR; INTRAVENOUS
Status: DISCONTINUED | OUTPATIENT
Start: 2019-12-02 | End: 2019-12-02 | Stop reason: HOSPADM

## 2019-12-02 RX ORDER — LIDOCAINE HYDROCHLORIDE 10 MG/ML
INJECTION, SOLUTION EPIDURAL; INFILTRATION; INTRACAUDAL; PERINEURAL PRN
Status: DISCONTINUED | OUTPATIENT
Start: 2019-12-02 | End: 2019-12-02 | Stop reason: SDUPTHER

## 2019-12-02 RX ORDER — IBUPROFEN 800 MG/1
800 TABLET ORAL EVERY 8 HOURS PRN
Status: DISCONTINUED | OUTPATIENT
Start: 2019-12-03 | End: 2019-12-04 | Stop reason: HOSPADM

## 2019-12-02 RX ORDER — OXYCODONE HYDROCHLORIDE AND ACETAMINOPHEN 5; 325 MG/1; MG/1
2 TABLET ORAL EVERY 4 HOURS PRN
Status: DISCONTINUED | OUTPATIENT
Start: 2019-12-02 | End: 2019-12-04 | Stop reason: HOSPADM

## 2019-12-02 RX ADMIN — CEFAZOLIN 1 G: 1 INJECTION, POWDER, FOR SOLUTION INTRAMUSCULAR; INTRAVENOUS at 15:58

## 2019-12-02 RX ADMIN — OXYCODONE HYDROCHLORIDE AND ACETAMINOPHEN 1 TABLET: 5; 325 TABLET ORAL at 20:11

## 2019-12-02 RX ADMIN — MIDAZOLAM HYDROCHLORIDE 2 MG: 1 INJECTION INTRAMUSCULAR; INTRAVENOUS at 11:14

## 2019-12-02 RX ADMIN — LIDOCAINE HYDROCHLORIDE 40 MG: 10 INJECTION, SOLUTION EPIDURAL; INFILTRATION; INTRACAUDAL at 08:06

## 2019-12-02 RX ADMIN — SODIUM CHLORIDE, POTASSIUM CHLORIDE, SODIUM LACTATE AND CALCIUM CHLORIDE: 600; 310; 30; 20 INJECTION, SOLUTION INTRAVENOUS at 07:13

## 2019-12-02 RX ADMIN — ONDANSETRON 4 MG: 2 INJECTION INTRAMUSCULAR; INTRAVENOUS at 15:05

## 2019-12-02 RX ADMIN — SODIUM CHLORIDE: 9 INJECTION, SOLUTION INTRAVENOUS at 20:10

## 2019-12-02 RX ADMIN — SODIUM CHLORIDE, POTASSIUM CHLORIDE, SODIUM LACTATE AND CALCIUM CHLORIDE: 600; 310; 30; 20 INJECTION, SOLUTION INTRAVENOUS at 08:51

## 2019-12-02 RX ADMIN — Medication 3 MG: at 09:50

## 2019-12-02 RX ADMIN — Medication 5 MG: at 08:31

## 2019-12-02 RX ADMIN — SODIUM CHLORIDE 500 ML: 9 INJECTION, SOLUTION INTRAVENOUS at 16:31

## 2019-12-02 RX ADMIN — PROPOFOL 160 MG: 10 INJECTION, EMULSION INTRAVENOUS at 08:06

## 2019-12-02 RX ADMIN — HYDROMORPHONE HYDROCHLORIDE 0.5 MG: 1 INJECTION, SOLUTION INTRAMUSCULAR; INTRAVENOUS; SUBCUTANEOUS at 10:48

## 2019-12-02 RX ADMIN — KETOROLAC TROMETHAMINE 30 MG: 30 INJECTION, SOLUTION INTRAMUSCULAR; INTRAVENOUS at 09:46

## 2019-12-02 RX ADMIN — SODIUM CHLORIDE, POTASSIUM CHLORIDE, SODIUM LACTATE AND CALCIUM CHLORIDE: 600; 310; 30; 20 INJECTION, SOLUTION INTRAVENOUS at 07:57

## 2019-12-02 RX ADMIN — SODIUM CHLORIDE: 9 INJECTION, SOLUTION INTRAVENOUS at 12:08

## 2019-12-02 RX ADMIN — DEXAMETHASONE SODIUM PHOSPHATE 4 MG: 4 INJECTION, SOLUTION INTRA-ARTICULAR; INTRALESIONAL; INTRAMUSCULAR; INTRAVENOUS; SOFT TISSUE at 08:19

## 2019-12-02 RX ADMIN — Medication 0.6 MG: at 09:50

## 2019-12-02 RX ADMIN — HEPARIN SODIUM 5000 UNITS: 5000 INJECTION INTRAVENOUS; SUBCUTANEOUS at 20:11

## 2019-12-02 RX ADMIN — CLINDAMYCIN IN 5 PERCENT DEXTROSE 900 MG: 18 INJECTION, SOLUTION INTRAVENOUS at 12:08

## 2019-12-02 RX ADMIN — MORPHINE SULFATE 2 MG: 2 INJECTION, SOLUTION INTRAMUSCULAR; INTRAVENOUS at 15:05

## 2019-12-02 RX ADMIN — FENTANYL CITRATE 50 MCG: 50 INJECTION, SOLUTION INTRAMUSCULAR; INTRAVENOUS at 08:06

## 2019-12-02 RX ADMIN — MIDAZOLAM 2 MG: 1 INJECTION INTRAMUSCULAR; INTRAVENOUS at 07:54

## 2019-12-02 RX ADMIN — ONDANSETRON 4 MG: 2 INJECTION INTRAMUSCULAR; INTRAVENOUS at 09:22

## 2019-12-02 RX ADMIN — ROCURONIUM BROMIDE 50 MG: 10 INJECTION, SOLUTION INTRAVENOUS at 08:06

## 2019-12-02 RX ADMIN — PROMETHAZINE HYDROCHLORIDE 12.5 MG: 25 INJECTION INTRAMUSCULAR; INTRAVENOUS at 18:09

## 2019-12-02 RX ADMIN — Medication 2 G: at 08:12

## 2019-12-02 RX ADMIN — FENTANYL CITRATE 50 MCG: 50 INJECTION, SOLUTION INTRAMUSCULAR; INTRAVENOUS at 08:37

## 2019-12-02 RX ADMIN — MORPHINE SULFATE 0.2 MG: 1 INJECTION EPIDURAL; INTRATHECAL; INTRAVENOUS at 08:05

## 2019-12-02 RX ADMIN — KETOROLAC TROMETHAMINE 30 MG: 30 INJECTION, SOLUTION INTRAMUSCULAR at 22:29

## 2019-12-02 RX ADMIN — HYDROMORPHONE HYDROCHLORIDE 0.5 MG: 1 INJECTION, SOLUTION INTRAMUSCULAR; INTRAVENOUS; SUBCUTANEOUS at 10:31

## 2019-12-02 RX ADMIN — CEFAZOLIN 1 G: 1 INJECTION, POWDER, FOR SOLUTION INTRAMUSCULAR; INTRAVENOUS at 23:47

## 2019-12-02 RX ADMIN — DOCUSATE SODIUM 100 MG: 100 CAPSULE, LIQUID FILLED ORAL at 20:11

## 2019-12-02 RX ADMIN — MORPHINE SULFATE 4 MG: 4 INJECTION, SOLUTION INTRAMUSCULAR; INTRAVENOUS at 12:42

## 2019-12-02 RX ADMIN — KETOROLAC TROMETHAMINE 30 MG: 30 INJECTION, SOLUTION INTRAMUSCULAR at 15:58

## 2019-12-02 RX ADMIN — GENTAMICIN SULFATE 315 MG: 40 INJECTION, SOLUTION INTRAMUSCULAR; INTRAVENOUS at 12:44

## 2019-12-02 RX ADMIN — MIDAZOLAM 2 MG: 1 INJECTION INTRAMUSCULAR; INTRAVENOUS at 11:14

## 2019-12-02 ASSESSMENT — PULMONARY FUNCTION TESTS
PIF_VALUE: 14
PIF_VALUE: 12
PIF_VALUE: 12
PIF_VALUE: 13
PIF_VALUE: 13
PIF_VALUE: 12
PIF_VALUE: 14
PIF_VALUE: 12
PIF_VALUE: 12
PIF_VALUE: 13
PIF_VALUE: 14
PIF_VALUE: 3
PIF_VALUE: 14
PIF_VALUE: 14
PIF_VALUE: 0
PIF_VALUE: 14
PIF_VALUE: 1
PIF_VALUE: 12
PIF_VALUE: 2
PIF_VALUE: 12
PIF_VALUE: 12
PIF_VALUE: 14
PIF_VALUE: 12
PIF_VALUE: 14
PIF_VALUE: 14
PIF_VALUE: 0
PIF_VALUE: 2
PIF_VALUE: 12
PIF_VALUE: 14
PIF_VALUE: 13
PIF_VALUE: 2
PIF_VALUE: 1
PIF_VALUE: 14
PIF_VALUE: 13
PIF_VALUE: 14
PIF_VALUE: 0
PIF_VALUE: 13
PIF_VALUE: 13
PIF_VALUE: 12
PIF_VALUE: 13
PIF_VALUE: 14
PIF_VALUE: 12
PIF_VALUE: 13
PIF_VALUE: 13
PIF_VALUE: 14
PIF_VALUE: 13
PIF_VALUE: 21
PIF_VALUE: 14
PIF_VALUE: 13
PIF_VALUE: 12
PIF_VALUE: 22
PIF_VALUE: 13
PIF_VALUE: 14
PIF_VALUE: 14
PIF_VALUE: 12
PIF_VALUE: 1
PIF_VALUE: 13
PIF_VALUE: 14
PIF_VALUE: 19
PIF_VALUE: 12
PIF_VALUE: 12
PIF_VALUE: 15
PIF_VALUE: 14
PIF_VALUE: 2
PIF_VALUE: 13
PIF_VALUE: 14
PIF_VALUE: 13
PIF_VALUE: 14
PIF_VALUE: 13
PIF_VALUE: 1
PIF_VALUE: 14
PIF_VALUE: 13
PIF_VALUE: 14
PIF_VALUE: 14
PIF_VALUE: 13
PIF_VALUE: 13
PIF_VALUE: 12
PIF_VALUE: 14
PIF_VALUE: 13
PIF_VALUE: 2
PIF_VALUE: 0
PIF_VALUE: 20
PIF_VALUE: 1
PIF_VALUE: 14
PIF_VALUE: 2
PIF_VALUE: 4
PIF_VALUE: 2
PIF_VALUE: 14
PIF_VALUE: 1
PIF_VALUE: 1
PIF_VALUE: 0
PIF_VALUE: 13
PIF_VALUE: 12
PIF_VALUE: 0
PIF_VALUE: 12
PIF_VALUE: 12
PIF_VALUE: 13
PIF_VALUE: 0
PIF_VALUE: 14
PIF_VALUE: 13
PIF_VALUE: 14
PIF_VALUE: 14
PIF_VALUE: 13
PIF_VALUE: 2
PIF_VALUE: 13
PIF_VALUE: 0
PIF_VALUE: 1
PIF_VALUE: 14
PIF_VALUE: 13
PIF_VALUE: 13
PIF_VALUE: 14
PIF_VALUE: 2

## 2019-12-02 ASSESSMENT — PAIN DESCRIPTION - PAIN TYPE: TYPE: SURGICAL PAIN

## 2019-12-02 ASSESSMENT — PAIN SCALES - GENERAL
PAINLEVEL_OUTOF10: 8
PAINLEVEL_OUTOF10: 6
PAINLEVEL_OUTOF10: 8
PAINLEVEL_OUTOF10: 6
PAINLEVEL_OUTOF10: 5
PAINLEVEL_OUTOF10: 3
PAINLEVEL_OUTOF10: 5
PAINLEVEL_OUTOF10: 4
PAINLEVEL_OUTOF10: 5
PAINLEVEL_OUTOF10: 6
PAINLEVEL_OUTOF10: 3

## 2019-12-02 ASSESSMENT — PAIN - FUNCTIONAL ASSESSMENT: PAIN_FUNCTIONAL_ASSESSMENT: 0-10

## 2019-12-02 ASSESSMENT — PAIN DESCRIPTION - LOCATION: LOCATION: ABDOMEN

## 2019-12-02 ASSESSMENT — PAIN DESCRIPTION - PROGRESSION: CLINICAL_PROGRESSION: NOT CHANGED

## 2019-12-02 ASSESSMENT — PAIN DESCRIPTION - FREQUENCY: FREQUENCY: CONTINUOUS

## 2019-12-02 ASSESSMENT — PAIN DESCRIPTION - DESCRIPTORS
DESCRIPTORS: ACHING
DESCRIPTORS: SHARP
DESCRIPTORS: DISCOMFORT

## 2019-12-02 ASSESSMENT — ENCOUNTER SYMPTOMS
STRIDOR: 0
SHORTNESS OF BREATH: 0

## 2019-12-02 ASSESSMENT — PAIN DESCRIPTION - ORIENTATION: ORIENTATION: LOWER

## 2019-12-02 ASSESSMENT — PAIN DESCRIPTION - ONSET: ONSET: AWAKENED FROM SLEEP

## 2019-12-02 ASSESSMENT — LIFESTYLE VARIABLES: SMOKING_STATUS: 0

## 2019-12-02 NOTE — H&P
surgical, conservative as well as definitive. She has elected to proceed with the procedure as stated above.     The patient was counseled on the procedure. Risks and complications were reviewed in detail. The patients orders, labs, consents have been completed. The history and physical as well as all supporting surgical documentation will be forwarded to the pre-operative holding area.     The patient is aware that this procedure may not alleviate her symptoms. That there may be a necessity for a second surgery and that there may be an incomplete removal of abnormal tissue.                     Home care, Restrictions & Follow up Care review completed    Planned Office Follow up was reviewed with the patient and her family and is for  2 weeks. The patient will call to make this appointment unless she already has done so.

## 2019-12-02 NOTE — PROGRESS NOTES
[Urine:500; Blood:100]  Current Shift: I/O this shift:  In: -   Out: 200 [Emesis/NG output:200]    General Appearance:   alert, appears stated age and cooperative    Mental Status:  alert, oriented to person, place, and time    Lungs:  Normal expansion. Clear to auscultation. No rales, rhonchi, or wheezing. Heart:  normal rate, normal S1 and S2, no gallops, intact distal pulses and no carotid bruits    Abdomen:  soft, nontender, nondistended, no masses or organomegaly, no rebound, guarding or rigidity.       Incision:  Bandage in place with on serosanguinous drainage noted      Extremities:  peripheral pulses normal, no pedal edema, no clubbing or cyanosis      Vaginal Vault:  Not examined        Assessment:  Elizabeth Cordova is a 62 y.o. female       POD # 0  Hospital Day: 1    S/P: HYSTERECTOMY ABDOMINAL TOTAL BILATERAL SALPINGO-OOPHORECTOMY ENTEROCELE REPAIR      Patient Active Problem List   Diagnosis    Hypothyroid    Family history of breast cancer    H/O LEEP    H/O right hip replacement    Type A blood, Rh negative    Menorrhagia with regular cycle    Bulky or enlarged uterus    Dysmenorrhea    Pelvic pressure in female    Intramural and subserous leiomyoma of uterus 12-14 cm    Acquired pelvic enterocele grade 2    S/P ANDREY-BSO (total abdominal hysterectomy and bilateral salpingo-oophorectomy) Enterocele repair 2019    ANDREY BSO w/ Enterocele Repair 19       Condition: good          Plan:  Orders Placed This Encounter   Procedures    Urinalysis Reflex to Culture     URINE POST CATHETER INSERTION  Pre-op diagnosis:  HEAVY PAINFUL PERIODS FIBROIDS ENLARGED UTERUS     Standing Status:   Standing     Number of Occurrences:   1    Surgical Pathology     LEFT ADNEXA  Pre-op diagnosis:  HEAVY PAINFUL PERIODS FIBROIDS ENLARGED UTERUS     Standing Status:   Standing     Number of Occurrences:   1    Basic Metabolic Panel w/ Reflex to MG     Standing Status:   Standing     Number of Diet:General   - IVF: NS @ 125 ml/hr   - Path: Pending    Nausea/Vomiting   - Zofran/Phenergan PRN   - Advance diet as tolerated   - Discussed this is likely due to IV morphine.  Will try to start Percocet if patient tolerates diet    Hypothyroidism   - 75 mcg QD        Provider's Name:  Dr. Antonia Briones,

## 2019-12-02 NOTE — INTERVAL H&P NOTE
Verified      Color, UA 11/25/2019 YELLOW  YELLOW Final    Turbidity UA 11/25/2019 CLEAR  CLEAR Final    Glucose, Ur 11/25/2019 NEGATIVE  NEGATIVE Final    Bilirubin Urine 11/25/2019 NEGATIVE  NEGATIVE Final    Ketones, Urine 11/25/2019 NEGATIVE  NEGATIVE Final    Specific [place], UA 11/25/2019 1.001  1.000 - 1.030 Final    Urine Hgb 11/25/2019 TRACE* NEGATIVE Final    pH, UA 11/25/2019 6.5  5.0 - 8.0 Final    Protein, UA 11/25/2019 NEGATIVE  NEGATIVE Final    Urobilinogen, Urine 11/25/2019 Normal  Normal Final    Nitrite, Urine 11/25/2019 NEGATIVE  NEGATIVE Final    Leukocyte Esterase, Urine 11/25/2019 NEGATIVE  NEGATIVE Final    Urinalysis Comments 11/25/2019 NOT REPORTED   Final    Specimen Description 11/25/2019 . CLEAN CATCH URINE   Final    Special Requests 11/25/2019 NOT REPORTED   Final    Culture 11/25/2019 NO SIGNIFICANT GROWTH   Final    Ventricular Rate 11/25/2019 60  BPM Final    Atrial Rate 11/25/2019 60  BPM Final    P-R Interval 11/25/2019 132  ms Final    QRS Duration 11/25/2019 92  ms Final    Q-T Interval 11/25/2019 434  ms Final    QTc Calculation (Bazett) 11/25/2019 434  ms Final    P Axis 11/25/2019 55  degrees Final    R Axis 11/25/2019 68  degrees Final    T Axis 11/25/2019 48  degrees Final    - 11/25/2019        Final    WBC, UA 11/25/2019 0 TO 2  /HPF Final    RBC, UA 11/25/2019 0 TO 2  /HPF Final    Casts UA 11/25/2019 NOT REPORTED  /LPF Final    Crystals UA 11/25/2019 NOT REPORTED  None /HPF Final    Epithelial Cells UA 11/25/2019 0 TO 2  /HPF Final    Renal Epithelial, Urine 11/25/2019 NOT REPORTED  0 /HPF Final    Bacteria, UA 11/25/2019 FEW* None Final    Mucus, UA 11/25/2019 NOT REPORTED  None Final    Trichomonas, UA 11/25/2019 NOT REPORTED  None Final    Amorphous, UA 11/25/2019 NOT REPORTED  None Final    Other Observations UA 11/25/2019 NOT REPORTED  NOT REQ.  Final    Yeast, UA 11/25/2019 NOT REPORTED  None Final       Assessment: Menorrhagia with regular cycle      2. Fibroids      3. Pelvic pressure in female      4. Pelvic pain in female      5. Bulky or enlarged uterus      6. H/O LEEP      7. Acquired hypothyroidism      8. Dyspareunia, female           Planned Procedure:  1. ANDREY BSO   2.  Possible Enterocele        Electronically signed by Sheila Navarro DO  on 12/2/2019 at 7:36 AM

## 2019-12-02 NOTE — FLOWSHEET NOTE
Dr. Lisa Kam notified of BP 88/53 and c/o nausea and emesis with order received to given IV bolus of NS

## 2019-12-02 NOTE — OP NOTE
Gynecologic Operative Note:      NAME: Génesis Feldman   MRN: [id number]  CSN: [id number]  : 1965      PROCEDURE DATE: 2019     Pre-op Diagnosis: HEAVY MENSES-REGULAR FIBROIDS ENLARGED UTERUS DYSMENORRHEA PELVIC PRESSURE & PAIN  DYSMENORRHEA FAMILY HX OF BREAST CANCER     Post-op Diagnosis: Same; ENTEROCELE ACQUIRED GRADE 2    Procedure: Procedure(s): HYSTERECTOMY ABDOMINAL TOTAL BILATERAL SALPINGO-OOPHORECTOMY ENTEROCELE REPAIR    Surgeon: Janey Jara DO    Assistant:   1. Marlene Wilkins DO  2.  Tammi Harmon DO    Circulator Documentation of Staff:  Surgeon(s) and Role:     * Janey Jara DO - Primary     * Marlene Wilkins DO - Assisting    OR Staff:  Scrub Person First: Ngoc Chappell      Anesthesia Type: General  Anesthesia Staff: Anesthesiologist: Jim Crespo MD  CRNA: Sherren Child, APRN - CRNA  SRNA: Michoacano Hernandez RN      Complications: None      Estimated Blood Loss: 100 ml  Total IV Fluids:  1400 ml  Urine Output: 500 ml clear      Specimens:   ID Type Source Tests Collected by Time Destination   1 : URINE POST CATHETER INSERTION Urine Urine URINE RT REFLEX TO [address] [facility],  2019 2845    A : LEFT ADNEXA Tissue Uterus SURGICAL PATHOLOGY Janey Kapoorith, DO 2019 0845    B : RIGHT ADNEXA Tissue Uterus SURGICAL PATHOLOGY Janey Jara, DO 2019 0851    C : UTERUS AND CERVIX Tissue Uterus SURGICAL PATHOLOGY Janey Jara,  2019 0912      Implants: * No implants in log *    Drains:   Urethral Catheter Non-latex 16 fr (Active)         Condition: Stable    Findings: The patient's intra-abdominal cavity was explored. The liver edge was smooth without studding. Both renal beds were without masses. There was not any para-aortic or ileac lymphadenopathy. The omentum was without caking. The uterus was enlarged, 12-14 cm and irregular.   The ovaries were inspected and found to be without masses, the right ovary had a simple weeping follicle ~1.3 cm. There was not adhesive disease. The urine was clear in the tubing and the bag after the morales was placed during prep and at the completion of the procedure. The pedicles were inspected at the end of the procedure and were hemostatic. The ureters were traced at the inception of the procedure and throughout both pre and post clamp placement and were identified at the conclusion of the procedure and noted peristalsis bilaterally. All counts were correct and called for prior to closure of the peritoneum, fascial and skin layers. Description of Procedure: (Understanding of limitations from template op-reports exist)  The patient was seen in the pre-operative area. The procedure risk and complications were reviewed. The consent , labs , and H&P were reviewed. The patient had all of her questions answered. The patient was moved to the operative suite where she was placed under general anesthesia by the anesthesiologist.  The patient was then timed out. A spinal anesthetic was given prior to her general in the seated position. The patient  had a sterile prep and drape with EPC's in place, SCIP protocol antibiotics were infused, and a morales catheter was draining clear yellow urine. A small pfannenstiel incision was made and carried down to the fascia. The fascia was taken laterally in both directions and elevated off the underlying musculature with blunt and sharp dissection. The peritoneum was identified and entered digitally. This was taken superiorly and inferiorly, care not to involve the bowel or the bladder. The upper abdomen and lower pelvis was inspected and explored. There was no paraaortic or ileac lymphadenopathy. The liver was smooth and there was no peritoneal studding. The ureters were traced bilaterally, there was no renal bed pathology as palpated. The bowel was packed out of the surgical field with 3 lap sponges.  The hand retractor attachments were utilized from the Curtis but the retractor itself was not required. The attachments were palpated and there was no compression of underlying structures. Manuel Manger was placed on the fundus to bring the uterus up to the incision. A ribbon was used posteriorly. The round ligaments were identified and were double clamped and cut with the bovie cautery they were tied bilaterally. The vesico-uterine refection was released with blunt and sharp dissection. The bladder was pushed out of the surgical field. The fallopian tubes with the ovaries were identified and grasped with an allis clamp and brought medially to the ligature backstop and will be removed with the specimen. A window was created in the broad ligament, the IP ligaments were cross clamped and tied x 2. Initially a ligature backstop was placed then a transfixation stitch was utilized after division of the tissue from between the cross clamped tissue with Metzenbaum scissors. Both sutures were \"O\"Vicryl. The posterior peritoneum was released with the Bovie cautery to drop the ureters bilaterally. This was a total of 2-3 mm. Curved Jeyson's were then placed at 45 degree angles at the Corporal-Isthmic Junction, clamping off the distal end of the uterine artery and vein. They were secured with \"O\" vicryl jeyson Stitches. Serial straight Zeplen clamps staying parallel to the cervix with each pedicle secured with \"O\" vicryl ties, these ties incorporated 1/3rd of the previous pedicle and stayed parallel to the cervix. The ureters were identified pre and post clamp placement. This continued to just above the distal end of the cervix. A single tooth tenaculum was placed mid-cervix and the pubocervical fascia was incised with Bovie cautrey. The vagina was entered bluntly with a Zolaggi clamp. An allis was then placed on the anterior vaginal tissue. Matilde scissors were utilized to excise the remainder of the specimen.  Kocher's were placed on the corners of the

## 2019-12-02 NOTE — DISCHARGE SUMMARY
ZOFRAN-ODT  Take 1 tablet by mouth every 8 hours as needed for Nausea or Vomiting     oxyCODONE-acetaminophen 5-325 MG per tablet  Commonly known as:  PERCOCET  Take 1 tablet by mouth every 6 hours as needed for Pain for up to 7 days. CONTINUE taking these medications    acetaminophen 500 MG tablet  Commonly known as:  TYLENOL     levothyroxine 75 MCG tablet  Commonly known as:  SYNTHROID           Where to Get Your Medications      You can get these medications from any pharmacy    Bring a paper prescription for each of these medications  · docusate sodium 100 MG capsule  · ibuprofen 800 MG tablet  · ondansetron 4 MG disintegrating tablet  · oxyCODONE-acetaminophen 5-325 MG per tablet           Activity: pelvic rest x 6 weeks, no driving on narcotics, no lifting greater than 15 lbs  Diet: regular diet  Follow up: 2 weeks     Condition on discharge: good and stable   Discharge Date: 12/4/19    Comments:  Home care, Follow-up care, restrictions reviewed.     Kentrell Manning DO  Ob/Gyn Resident  [facility]  12/4/2019, 7:19 AM

## 2019-12-03 LAB
ANION GAP SERPL CALCULATED.3IONS-SCNC: 8 MMOL/L (ref 9–17)
BUN BLDV-MCNC: 7 MG/DL (ref 6–20)
BUN/CREAT BLD: ABNORMAL (ref 9–20)
CALCIUM SERPL-MCNC: 7.7 MG/DL (ref 8.6–10.4)
CHLORIDE BLD-SCNC: 106 MMOL/L (ref 98–107)
CO2: 25 MMOL/L (ref 20–31)
CREAT SERPL-MCNC: 0.47 MG/DL (ref 0.5–0.9)
GFR AFRICAN AMERICAN: >60 ML/MIN
GFR NON-AFRICAN AMERICAN: >60 ML/MIN
GFR SERPL CREATININE-BSD FRML MDRD: ABNORMAL ML/MIN/{1.73_M2}
GFR SERPL CREATININE-BSD FRML MDRD: ABNORMAL ML/MIN/{1.73_M2}
GLUCOSE BLD-MCNC: 103 MG/DL (ref 70–99)
HCT VFR BLD CALC: 31.5 % (ref 36–46)
HEMOGLOBIN: 10.9 G/DL (ref 12–16)
POTASSIUM SERPL-SCNC: 3.8 MMOL/L (ref 3.7–5.3)
SODIUM BLD-SCNC: 139 MMOL/L (ref 135–144)

## 2019-12-03 PROCEDURE — 85018 HEMOGLOBIN: CPT

## 2019-12-03 PROCEDURE — G0378 HOSPITAL OBSERVATION PER HR: HCPCS

## 2019-12-03 PROCEDURE — 96372 THER/PROPH/DIAG INJ SC/IM: CPT

## 2019-12-03 PROCEDURE — 85014 HEMATOCRIT: CPT

## 2019-12-03 PROCEDURE — 96374 THER/PROPH/DIAG INJ IV PUSH: CPT

## 2019-12-03 PROCEDURE — 99024 POSTOP FOLLOW-UP VISIT: CPT | Performed by: OBSTETRICS & GYNECOLOGY

## 2019-12-03 PROCEDURE — 2580000003 HC RX 258: Performed by: STUDENT IN AN ORGANIZED HEALTH CARE EDUCATION/TRAINING PROGRAM

## 2019-12-03 PROCEDURE — 1220000000 HC SEMI PRIVATE OB R&B

## 2019-12-03 PROCEDURE — 6370000000 HC RX 637 (ALT 250 FOR IP): Performed by: STUDENT IN AN ORGANIZED HEALTH CARE EDUCATION/TRAINING PROGRAM

## 2019-12-03 PROCEDURE — 80048 BASIC METABOLIC PNL TOTAL CA: CPT

## 2019-12-03 PROCEDURE — 96361 HYDRATE IV INFUSION ADD-ON: CPT

## 2019-12-03 PROCEDURE — 36415 COLL VENOUS BLD VENIPUNCTURE: CPT

## 2019-12-03 PROCEDURE — 6360000002 HC RX W HCPCS: Performed by: STUDENT IN AN ORGANIZED HEALTH CARE EDUCATION/TRAINING PROGRAM

## 2019-12-03 RX ORDER — DOCUSATE SODIUM 100 MG/1
100 CAPSULE, LIQUID FILLED ORAL 2 TIMES DAILY PRN
Qty: 60 CAPSULE | Refills: 0 | Status: SHIPPED | OUTPATIENT
Start: 2019-12-03 | End: 2021-10-06

## 2019-12-03 RX ORDER — IBUPROFEN 800 MG/1
800 TABLET ORAL EVERY 8 HOURS PRN
Qty: 30 TABLET | Refills: 0 | Status: SHIPPED | OUTPATIENT
Start: 2019-12-03

## 2019-12-03 RX ORDER — OXYCODONE HYDROCHLORIDE AND ACETAMINOPHEN 5; 325 MG/1; MG/1
1 TABLET ORAL EVERY 6 HOURS PRN
Qty: 20 TABLET | Refills: 0 | Status: SHIPPED | OUTPATIENT
Start: 2019-12-03 | End: 2019-12-10

## 2019-12-03 RX ORDER — ONDANSETRON 4 MG/1
4 TABLET, ORALLY DISINTEGRATING ORAL EVERY 8 HOURS PRN
Qty: 10 TABLET | Refills: 0 | Status: SHIPPED | OUTPATIENT
Start: 2019-12-03 | End: 2019-12-17

## 2019-12-03 RX ADMIN — DOCUSATE SODIUM 100 MG: 100 CAPSULE, LIQUID FILLED ORAL at 08:12

## 2019-12-03 RX ADMIN — OXYCODONE HYDROCHLORIDE AND ACETAMINOPHEN 1 TABLET: 5; 325 TABLET ORAL at 08:12

## 2019-12-03 RX ADMIN — IBUPROFEN 800 MG: 800 TABLET, FILM COATED ORAL at 16:51

## 2019-12-03 RX ADMIN — HEPARIN SODIUM 5000 UNITS: 5000 INJECTION INTRAVENOUS; SUBCUTANEOUS at 08:13

## 2019-12-03 RX ADMIN — LEVOTHYROXINE SODIUM 75 MCG: 75 TABLET ORAL at 07:20

## 2019-12-03 RX ADMIN — MAGNESIUM HYDROXIDE 30 ML: 400 SUSPENSION ORAL at 08:12

## 2019-12-03 RX ADMIN — OXYCODONE HYDROCHLORIDE AND ACETAMINOPHEN 1 TABLET: 5; 325 TABLET ORAL at 14:49

## 2019-12-03 RX ADMIN — KETOROLAC TROMETHAMINE 30 MG: 30 INJECTION, SOLUTION INTRAMUSCULAR at 04:25

## 2019-12-03 RX ADMIN — DOCUSATE SODIUM 100 MG: 100 CAPSULE, LIQUID FILLED ORAL at 20:03

## 2019-12-03 RX ADMIN — HEPARIN SODIUM 5000 UNITS: 5000 INJECTION INTRAVENOUS; SUBCUTANEOUS at 20:03

## 2019-12-03 RX ADMIN — Medication 10 ML: at 04:27

## 2019-12-03 RX ADMIN — OXYCODONE HYDROCHLORIDE AND ACETAMINOPHEN 1 TABLET: 5; 325 TABLET ORAL at 20:03

## 2019-12-03 ASSESSMENT — PAIN SCALES - GENERAL
PAINLEVEL_OUTOF10: 5
PAINLEVEL_OUTOF10: 4
PAINLEVEL_OUTOF10: 4
PAINLEVEL_OUTOF10: 5
PAINLEVEL_OUTOF10: 5

## 2019-12-03 ASSESSMENT — PAIN DESCRIPTION - ORIENTATION: ORIENTATION: LOWER

## 2019-12-03 ASSESSMENT — PAIN DESCRIPTION - LOCATION: LOCATION: ABDOMEN

## 2019-12-03 ASSESSMENT — PAIN DESCRIPTION - PAIN TYPE: TYPE: SURGICAL PAIN

## 2019-12-03 ASSESSMENT — PAIN DESCRIPTION - DESCRIPTORS
DESCRIPTORS: CRAMPING

## 2019-12-03 NOTE — PROGRESS NOTES
bruits    Abdomen:  soft, nontender, nondistended, no masses or organomegaly, bowel sounds present x 4, no rebound, guarding or rigidity noted      Incision:  Clean, dry and intact with steris in place      Extremities:  peripheral pulses normal, no pedal edema, no clubbing or cyanosis      Vaginal Vault:  Not examined                Assessment:  Yanci Ferrari is a 62 y.o. female       POD # 1  Hospital Day: 2    S/P:  S/P HYSTERECTOMY ABDOMINAL TOTAL BILATERAL SALPINGO-OOPHORECTOMY ENTEROCELE REPAIR       Patient Active Problem List   Diagnosis    Hypothyroid    Family history of breast cancer    H/O LEEP    H/O right hip replacement    Type A blood, Rh negative    Menorrhagia with regular cycle    Bulky or enlarged uterus    Dysmenorrhea    Pelvic pressure in female    Intramural and subserous leiomyoma of uterus 12-14 cm    Acquired pelvic enterocele grade 2    S/P ANDREY-BSO (total abdominal hysterectomy and bilateral salpingo-oophorectomy) Enterocele repair 2019    ANDREY BSO w/ Enterocele Repair 19       Condition: good          Plan:  Orders Placed This Encounter   Procedures    Urinalysis Reflex to Culture     URINE POST CATHETER INSERTION  Pre-op diagnosis:  HEAVY PAINFUL PERIODS FIBROIDS ENLARGED UTERUS     Standing Status:   Standing     Number of Occurrences:   1    Surgical Pathology     LEFT ADNEXA  Pre-op diagnosis:  HEAVY PAINFUL PERIODS FIBROIDS ENLARGED UTERUS     Standing Status:   Standing     Number of Occurrences:   1    Basic Metabolic Panel w/ Reflex to MG     Standing Status:   Standing     Number of Occurrences:   1    Hemoglobin and hematocrit, blood     Standing Status:   Standing     Number of Occurrences:   1    Surgical Pathology     Standing Status:   Standing     Number of Occurrences:   1    DIET GENERAL;     Standing Status:   Standing     Number of Occurrences:   1    Vital signs per unit routine     Standing Status:   Standing     Number of Occurrences:   1    Notify physician for     Notify physician for pulse less than 50 or greater than 120, respiratory rate 12/25, temperature greater than 100.4 F (38.0 C), urine output less than 30ml/hr, SBP less than 90 or greater than 160, DBP greater than 100, abnormal bleeding. Standing Status:   Standing     Number of Occurrences:   1    Ambulate patient     Progressive ambulation. Standing Status:   Standing     Number of Occurrences:   1    Straight catheterize     1) May straight catheterize one time, PRN for inablility to void. 2) If necessary to catheterize a second time, insert indwelling urinary catheter and leave in if residual is greater than 150ml. Standing Status:   Standing     Number of Occurrences:   1    Abdominal wound care     Postop day 1. Remove dressing and leave incision open to air. Standing Status:   Standing     Number of Occurrences:   1    Place intermittent pneumatic compression devices     When appropriate prophylactic therapy cannot be provided due to patient limitations, serial screening Duplex Doppler Ultrasound studies of the legs may be considered to assess for development of DVT.        Standing Status:   Standing     Number of Occurrences:   1    Intake and output     Call for urine output less than 120 ml in 4 hours     Standing Status:   Standing     Number of Occurrences:   23    Sultana catheter - discontinue     If UOP > 30 ml/hr over last 4 hours     Standing Status:   Standing     Number of Occurrences:   1    Full code     Standing Status:   Standing     Number of Occurrences:   [address] to Dose: Gentamicin     Standing Status:   Standing     Number of Occurrences:   1     Order Specific Question:   Pharmacy to Dose     Answer:   Gentamicin    Initiate Oxygen Therapy Protocol     Initiate oxygen therapy if the patient has 1) SpO2 is less than 90%, 2) Cyanosis, Chest Pain, Dyspnea, or Altered level of consciousness AND SpO2 checked and it mg/dL    CREATININE 0.47 (L) 0.50 - 0.90 mg/dL    Bun/Cre Ratio NOT REPORTED 9 - 20    Calcium 7.7 (L) 8.6 - 10.4 mg/dL    Sodium 139 135 - 144 mmol/L    Potassium 3.8 3.7 - 5.3 mmol/L    Chloride 106 98 - 107 mmol/L    CO2 25 20 - 31 mmol/L    Anion Gap 8 (L) 9 - 17 mmol/L    GFR Non-African American >60 >60 mL/min    GFR African American >60 >60 mL/min    GFR Comment          GFR Staging NOT REPORTED    Hemoglobin and hematocrit, blood   Result Value Ref Range    Hemoglobin 10.9 (L) 12.0 - 16.0 g/dL    Hematocrit 31.5 (L) 36 - 46 %   POCT HCG, Prenancy, Ur   Result Value Ref Range    HCG, Pregnancy Urine (POC) NEGATIVE NEGATIVE    - NOT REPORTED        Home care and Follow up Care were reviewed. Wound Care was reviewed. RTO 2 weeks    Post operative restrictions and follow up were reviewed. - Doing well, vitals stable. BPs noted to be low but patient's BP in pre-op was 100/46.              - S/P morales catheter, UOP appropriate. 1150 ml from 7005-9778.              - Encourage ambulation and use of incentive spirometer              - Pain control: Percocet/Motrin. Discussed with Anesthesia starting Percocet before 24 hour bao, and Dr. Jena Denton in agreement with plan. - Labs:H/H, BMP this AM and stable              - DVT Proph: Heparin 5000U BID              - Abx: s/p Ancef x 24. Additional doses of Clinda/Gent given due to no vaginal prep pre-operatively. - Diet:General              - IVF: saline locked              - Path: Pending     Nausea/Vomiting              - Resolved   - Tolerating PO diet     Hypothyroidism              - 75 mcg QD    Possible discharge home today.     Provider's Name:  Dr. Lawayne Sever, DO

## 2019-12-03 NOTE — PROGRESS NOTES
mL/min/1.73sq m  Kidney failure:   <15 mL/min/1.73sq m              eGFR calculated using average adult body mass.  Additional eGFR calculator available at:        [email or web address]            GFR Staging 11/25/2019 NOT REPORTED   Final    WBC 11/25/2019 5.1  3.5 - 11.0 k/uL Final    RBC 11/25/2019 4.18  4.0 - 5.2 m/uL Final    Hemoglobin 11/25/2019 13.3  12.0 - 16.0 g/dL Final    Hematocrit 11/25/2019 39.0  36 - 46 % Final    MCV 11/25/2019 93.2  80 - 100 fL Final    MCH 11/25/2019 31.9  26 - 34 pg Final    MCHC 11/25/2019 34.2  31 - 37 g/dL Final    RDW 11/25/2019 13.6  11.5 - 14.9 % Final    Platelets 25/71/4014 211  150 - 450 k/uL Final    MPV 11/25/2019 8.8  6.0 - 12.0 fL Final    NRBC Automated 11/25/2019 NOT REPORTED  per 100 WBC Final    Differential Type 11/25/2019 NOT REPORTED   Final    Immature Granulocytes 11/25/2019 NOT REPORTED  0 % Final    Absolute Immature Granulocyte 11/25/2019 NOT REPORTED  0.00 - 0.30 k/uL Final    WBC Morphology 11/25/2019 NOT REPORTED   Final    RBC Morphology 11/25/2019 NOT REPORTED   Final    Platelet Estimate 30/98/8775 NOT REPORTED   Final    Seg Neutrophils 11/25/2019 90* 36 - 66 % Final    Lymphocytes 11/25/2019 3* 24 - 44 % Final    Monocytes 11/25/2019 6  1 - 7 % Final    Eosinophils % 11/25/2019 0  0 - 4 % Final    Basophils 11/25/2019 0  0 - 2 % Final    Bands 11/25/2019 1  0 - 10 % Final    Segs Absolute 11/25/2019 4.59  1.3 - 9.1 k/uL Final    Absolute Lymph # 11/25/2019 0.15* 1.0 - 4.8 k/uL Final    Absolute Mono # 11/25/2019 0.31  0.1 - 1.3 k/uL Final    Absolute Eos # 11/25/2019 0.00  0.0 - 0.4 k/uL Final    Basophils Absolute 11/25/2019 0.00  0.0 - 0.2 k/uL Final    Absolute Bands # 11/25/2019 0.05  0.0 - 1.0 k/uL Final    Morphology 11/25/2019 Normal   Final    hCG Quant 11/25/2019 <1  <5 IU/L Final    Comment:    Non-preg premeno   <=5  Postmeno           <=8  Male               <=3  If HCG results 2019 0 TO 2  /HPF Final    Casts UA 2019 NOT REPORTED  /LPF Final    Crystals UA 2019 NOT REPORTED  None /HPF Final    Epithelial Cells UA 2019 0 TO 2  /HPF Final    Renal Epithelial, Urine 2019 NOT REPORTED  0 /HPF Final    Bacteria, UA 2019 FEW* None Final    Mucus, UA 2019 NOT REPORTED  None Final    Trichomonas, UA 2019 NOT REPORTED  None Final    Amorphous, UA 2019 NOT REPORTED  None Final    Other Observations UA 2019 NOT REPORTED  NOT REQ. Final    Yeast, UA 2019 NOT REPORTED  None Final   ]      Assessment:  Raj Villanueva is a 62 y.o. female   2. POD #1   3. S/P:HYSTERECTOMY ABDOMINAL TOTAL BILATERAL SALPINGO-OOPHORECTOMY ENTEROCELE REPAIR       Patient Active Problem List    Diagnosis Date Noted    Menorrhagia with regular cycle 2019     Priority: High    Bulky or enlarged uterus 2019     Priority: High    Dysmenorrhea 2019     Priority: High    Pelvic pressure in female 2019     Priority: High    Intramural and subserous leiomyoma of uterus 12-14 cm 2019     Priority: High    Acquired pelvic enterocele grade 2 2019     Priority: High    S/P ANDREY-BSO (total abdominal hysterectomy and bilateral salpingo-oophorectomy) Enterocele repair 2019     Priority: High    ANDREY BSO w/ Enterocele Repair 2019    Type A blood, Rh negative 2019    H/O right hip replacement 2017      Hypothyroid     Family history of breast cancer      MOM @ 61 Sister @ 48      H/O LEEP 1996           Condition: good          Plan:  Continue with current post op care  Increase activity  AM labs pending  Plan discharge home later today or in am     Home care and Follow up Care and Restrictions were reviewed. Wound Care was reviewed.     RTO 2 weeks          [id number] [facility]  [address] [address]  [phone]    Discharge Instructions for Hysterectomy     A hysterectomy is a surgical procedure to remove the uterus. The procedure may be done alone or with the removal or repair of ovaries, fallopian tubes, the cervix, and part of the vagina. Depending on your surgery, the hospital stay varies from 1-5 days. Recovery can take 6-8 weeks. What You Will Need   Dressing supplies   Steps to [address]    · Ask your doctor about when it is safe to shower, bathe, or soak in water. You may be advised to shower instead of taking a bath for the first two weeks after surgery. · Keep your incision sites clean and dry. · Wash your hands before changing the dressing. · Do not douche or put anything in your vagina, such as a tampon, until your doctor tells you otherwise. Diet    While in the hospital, you will progress from intravenous fluid to a normal diet. · If recommended by your doctor, eat a high-fiber diet to promote healing and prevent constipation . · Drink plenty of fluids. Physical Activity    · Ask your doctor when you will be able to return to work and drive. · Return to your normal activities gradually. Most normal activities, including sex, can be resumed in about six weeks. · Take daily walks as tolerated. · Avoid heavy lifting and strenuous exercise until your doctor gives you permission to do so. · Ask your doctor when and how to perform Kegel exercises . These exercises can strengthen the muscles of the pelvic floor and prevent or improve urinary incontinence , as well as enhance sexual pleasure. Medications    If you had to stop medicines before the procedure, ask your doctor when you can start again. Medicines often stopped include:   · Anti-inflammatory drugs (eg, aspirin )   · Blood thinners, like clopidogrel (Plavix) or warfarin (Coumadin)   You will likely go home with a prescription for pain medicine.  If you had your ovaries removed with your

## 2019-12-03 NOTE — FLOWSHEET NOTE
12/03/19 1311   Encounter Summary   Services provided to: Patient   Referral/Consult From: Teddy   Continue Visiting   (12/3/19V)   Volunteer Visit Yes   Complexity of Encounter Low   Length of Encounter 15 minutes   Routine   Type Follow up   [address] Patient received Critical access hospitalion

## 2019-12-04 VITALS
BODY MASS INDEX: 22.76 KG/M2 | SYSTOLIC BLOOD PRESSURE: 90 MMHG | TEMPERATURE: 97.9 F | RESPIRATION RATE: 16 BRPM | OXYGEN SATURATION: 95 % | WEIGHT: 145 LBS | DIASTOLIC BLOOD PRESSURE: 50 MMHG | HEART RATE: 57 BPM | HEIGHT: 67 IN

## 2019-12-04 LAB — SURGICAL PATHOLOGY REPORT: NORMAL

## 2019-12-04 PROCEDURE — 6360000002 HC RX W HCPCS: Performed by: STUDENT IN AN ORGANIZED HEALTH CARE EDUCATION/TRAINING PROGRAM

## 2019-12-04 PROCEDURE — G0378 HOSPITAL OBSERVATION PER HR: HCPCS

## 2019-12-04 PROCEDURE — 96372 THER/PROPH/DIAG INJ SC/IM: CPT

## 2019-12-04 PROCEDURE — 6370000000 HC RX 637 (ALT 250 FOR IP): Performed by: STUDENT IN AN ORGANIZED HEALTH CARE EDUCATION/TRAINING PROGRAM

## 2019-12-04 RX ADMIN — LEVOTHYROXINE SODIUM 75 MCG: 75 TABLET ORAL at 06:13

## 2019-12-04 RX ADMIN — OXYCODONE HYDROCHLORIDE AND ACETAMINOPHEN 1 TABLET: 5; 325 TABLET ORAL at 06:44

## 2019-12-04 RX ADMIN — DOCUSATE SODIUM 100 MG: 100 CAPSULE, LIQUID FILLED ORAL at 08:41

## 2019-12-04 RX ADMIN — IBUPROFEN 800 MG: 800 TABLET, FILM COATED ORAL at 02:30

## 2019-12-04 RX ADMIN — HEPARIN SODIUM 5000 UNITS: 5000 INJECTION INTRAVENOUS; SUBCUTANEOUS at 08:41

## 2019-12-04 ASSESSMENT — PAIN SCALES - GENERAL
PAINLEVEL_OUTOF10: 5
PAINLEVEL_OUTOF10: 2
PAINLEVEL_OUTOF10: 5
PAINLEVEL_OUTOF10: 0

## 2019-12-04 NOTE — PROGRESS NOTES
Patient Name: Melony Jaimes  Patient : 1965  Room/Bed: 0170/0170-01  Admission Date/Time: 2019  6:15 AM      Date: 2019  Time: 7:16 AM    2 Days Venus Day: 3    Sheridan Ferreira is a 62 y.o. female     This patient was seen today. She is POD #2    S/P HYSTERECTOMY ABDOMINAL TOTAL BILATERAL SALPINGO-OOPHORECTOMY ENTEROCELE REPAIR  . .    Today she she feels well. Her pain is well controlled with current pain medications. The patient is urinating well. She denies chest pain, shortness of breath, headache and lightheadedness. She is ambulating well. She denies any vaginal bleeding. The patient is tolerating a normal diet. She is passing flatus. She would like to go home today. Medications and Infusions:    Scheduled Medications:    levothyroxine  75 mcg Oral Daily    sodium chloride flush  10 mL Intravenous 2 times per day    docusate sodium  100 mg Oral BID    heparin (porcine)  5,000 Units Subcutaneous Q12H     Continuous Infusions:    sodium chloride Stopped (19 0427)     PRN Medications: sodium chloride flush, acetaminophen, oxyCODONE-acetaminophen **OR** oxyCODONE-acetaminophen, ibuprofen, ondansetron, promethazine      PHYSICAL EXAM:  BP (!) 102/46   Pulse 55   Temp 97.5 °F (36.4 °C)   Resp 16   Ht 5' 7\" (1.702 m)   Wt 145 lb (65.8 kg)   LMP 2019   SpO2 95%   BMI 22.71 kg/m²       Intake/Output:   Last Shift: I/O last 3 completed shifts:  In: -   Out: 500 [Urine:500]  Current Shift: No intake/output data recorded. General Appearance:   alert, appears stated age and cooperative    Mental Status:  alert, oriented to person, place, and time    Lungs:  Normal expansion. Clear to auscultation. No rales, rhonchi, or wheezing. Heart:  normal rate, normal S1 and S2, no gallops, intact distal pulses and no carotid bruits    Abdomen:  soft, nontender, nondistended, no masses or organomegaly, no rebound, guarding or rigidity.  Bowel sounds x (if indication Home Oxygen then change L/min to same amount at home and change Wean to Room Air to No). Notify provider if initiate oxygen therapy unless already on Home Oxygen. Standing Status:   Standing     Number of Occurrences:   7    POCT HCG, Prenancy, Ur     Standing Status:   Standing     Number of Occurrences:   1    Saline lock IV     Standing Status:   Standing     Number of Occurrences:   1    Transfer Patient     Standing Status:   Standing     Number of Occurrences:   1    PATIENT STATUS (FROM ED OR OR/PROCEDURAL) Inpatient     Standing Status:   Standing     Number of Occurrences:   1     Order Specific Question:   Patient Class     Answer:   Inpatient [101]     Order Specific Question:   REQUIRED: Diagnosis     Answer:   Postoperative state [[id number]]     Order Specific Question:   Estimated Length of Stay     Answer:   Estimated stay of more than 2 midnights     Order Specific Question:   Future Attending Provider     Answer:   Morteza Paige [[id number]]       Home care and Follow up Care were reviewed. Wound Care was reviewed. RTO 2 weeks    Post operative restrictions and follow up were reviewed. - Doing well, vitals stable.               - S/P morales catheter              - Encourage ambulation and use of incentive spirometer              - Pain control: Percocet/Motrin.               - Labs:H/H, BMP yesterday. Stable.              - DVT Proph: Heparin 5000U BID              - Abx: s/p Ancef x 24.  Additional doses of Clinda/Gent given due to no vaginal prep pre-operatively.              - Diet:General              - IVF: saline locked              - Path: Pending     Nausea/Vomiting              - Resolved              - Tolerating PO diet     Hypothyroidism              - 75 mcg QD    Discharge home today    Provider's Name:  Dr. Juana Sotelo,

## 2019-12-17 ENCOUNTER — OFFICE VISIT (OUTPATIENT)
Dept: OBGYN CLINIC | Age: 54
End: 2019-12-17

## 2019-12-17 VITALS
WEIGHT: 142 LBS | SYSTOLIC BLOOD PRESSURE: 100 MMHG | HEART RATE: 60 BPM | DIASTOLIC BLOOD PRESSURE: 60 MMHG | HEIGHT: 67 IN | BODY MASS INDEX: 22.29 KG/M2

## 2019-12-17 DIAGNOSIS — D50.9 IRON DEFICIENCY ANEMIA, UNSPECIFIED IRON DEFICIENCY ANEMIA TYPE: ICD-10-CM

## 2019-12-17 DIAGNOSIS — Z09 POSTOP CHECK: Primary | ICD-10-CM

## 2019-12-17 DIAGNOSIS — Z98.890 POSTOPERATIVE STATE: ICD-10-CM

## 2019-12-17 DIAGNOSIS — N92.0 MENORRHAGIA WITH REGULAR CYCLE: ICD-10-CM

## 2019-12-17 DIAGNOSIS — N85.2 BULKY OR ENLARGED UTERUS: ICD-10-CM

## 2019-12-17 DIAGNOSIS — R10.2 PELVIC PRESSURE IN FEMALE: ICD-10-CM

## 2019-12-17 DIAGNOSIS — R10.2 PELVIC PAIN IN FEMALE: ICD-10-CM

## 2019-12-17 DIAGNOSIS — D21.9 FIBROIDS: ICD-10-CM

## 2019-12-17 PROCEDURE — 99024 POSTOP FOLLOW-UP VISIT: CPT | Performed by: OBSTETRICS & GYNECOLOGY

## 2020-01-06 ENCOUNTER — HOSPITAL ENCOUNTER (OUTPATIENT)
Age: 55
Setting detail: SPECIMEN
Discharge: HOME | End: 2020-01-06
Payer: COMMERCIAL

## 2020-01-06 LAB
HCT VFR BLD CALC: 40.3 % (ref 36–46)
HEMOGLOBIN: 13.4 G/DL (ref 12–16)

## 2020-01-06 PROCEDURE — 85018 HEMOGLOBIN: CPT

## 2020-01-06 PROCEDURE — 85014 HEMATOCRIT: CPT

## 2020-01-06 PROCEDURE — 36415 COLL VENOUS BLD VENIPUNCTURE: CPT

## 2020-01-07 ENCOUNTER — TELEPHONE (OUTPATIENT)
Dept: OBGYN CLINIC | Age: 55
End: 2020-01-07

## 2020-01-07 NOTE — TELEPHONE ENCOUNTER
LM for pt to call office regarding H&H results. Pt no longer anemic and per Deneen Deleon pt can d/c ferrous sulfate if she is currently taking medication.

## 2020-01-07 NOTE — TELEPHONE ENCOUNTER
Patient returned call to the office, she was notified of WNL lab results and instructed she no longer needs to continue taking her ferrous sulfate.

## 2020-01-14 ENCOUNTER — OFFICE VISIT (OUTPATIENT)
Dept: OBGYN CLINIC | Age: 55
End: 2020-01-14

## 2020-01-14 VITALS
BODY MASS INDEX: 22.29 KG/M2 | HEART RATE: 69 BPM | SYSTOLIC BLOOD PRESSURE: 100 MMHG | DIASTOLIC BLOOD PRESSURE: 60 MMHG | HEIGHT: 67 IN | WEIGHT: 142 LBS

## 2020-01-14 PROCEDURE — 99024 POSTOP FOLLOW-UP VISIT: CPT | Performed by: OBSTETRICS & GYNECOLOGY

## 2020-01-14 NOTE — PROGRESS NOTES
the   aforementioned paratubal cyst.     Specimen \"B\":  Received in formalin labeled \"right adnexa\" is a   resected ovary and corresponding fallopian tube.  The ovary weighs 5   grams and measures 3 x 2 x 1 cm.  It has a bluish yellow external   cerebriform appearance.  Features of a collapsed cyst are noted.  The   cut surface is otherwise yellow to pinkish tan.  Sections of the ovary   are submitted in cassette #1.  The corresponding fallopian tube   measures 5 cm in length with a cross-sectional diameter of 0.7 cm. The fimbriated end demonstrates the usual villous appearance.  A   paratubal cyst measuring approximately 1 cm in greatest dimension is   also noted.   A paratubal cyst along with the distal end of the   fallopian tube is entirely submitted in cassette #2. Specimen \"C\":  Received in formalin is an uterus with attached cervix   weighing 292 grams.  The specimen measures 11 cm in length, 8 cm from   xctjo-mt-kctgc and 6 cm in the anterior posterior fundal dimension. The serosal surface, where present, is pink, smooth and glistening.    The exocervix measures 3.5 x 3 cm.  The external cervical os measures   1 cm.  Upon opening the uterus, the endometrial cavity is triangular   in shape and measures 4 x 3.5 cm.  The endocervical canal is pink and   glistening with the usual longitudinal folds.  The myometrium measures   2.8 cm in thickness.  A section of the anterior cervix is submitted in   cassette #1, posterior cervix cassette #2.  On sectioning through the   myometrium, the cut surface demonstrates features suggestive of   adenomyosis.  Sections of the anterior endomyometrium are submitted in   cassettes #3 through # 7.  The opposite endomyometrium are submitted   in cassettes #8 through #11.  A small gray-white nodule is noted in   the posterior myometrium measuring <1 cm.  This is submitted in   cassette #12.       Microscopic Description   Specimen \"A\":  Microscopic examination of two H&E FALLOPIAN TUBE WITH NO PATHOLOGIC DIAGNOSIS     BENIGN PARATUBAL CYST       SPECIMEN \"C\":  UTERUS:           EXOCERVICAL PARAKERATOSIS     ENDOCERVICAL NABOTHIAN CYST     ENDOMETRIAL HYPERPLASIA WITHOUT ATYPIA (SIMPLE HYPERPLASIA) ARISING IN   A BACKGROUND OF WEAKLY DISORDERED PROLIFERATIVE-TYPE ENDOMETRIUM     EXTENSIVE ADENOMYOSIS          LEIOMYOMA       Jez Mckinley D.O.   **Electronically Signed Out**         [facility]/12/4/2019     Clinical Information   Heavy painful periods, fibroids, enlarged uterus; hysterectomy   abdominal total bilateral salpingo-oophorectomy enterocele repair;   formalin time: A) 8:46, B) 8:51, C) 9:17     Source:   A: Left adnexa   B: Right adnexa   C: Uterus and cervix     Gross Description   Specimen \"A\":  Received in formalin labeled \"left adnexa\" is an ovary   with attached fallopian tube.  Following removal of the fallopian   tube, the ovary weighs 8 grams and measures 3 x 2.5 x 2 cm.  The   external surface is yellow to bluish tan.  On inspecting through the   ovary, the cut surface demonstrates an unilocular cyst measuring   approximately 2 cm in greatest dimension.  The cyst lining is smooth   and glistening without surface papillations.  Towards one end of the   specimen, a gray-white nodule measuring approximately 0.6 cm is noted.    Representative sections of the ovary are submitted in cassette #1.    The corresponding fallopian tube measures 5 cm in length with a   cross-sectional diameter averaging 0.7 cm.  The fimbriated end   demonstrates the usual villous appearance.  A paratubal cyst measuring   approximately 1 cm in greatest dimension is noted.  The specimen is   sectioned and the entire distal end of the fallopian tube is entirely   submitted for microscopic examination in cassette #2 along with the   aforementioned paratubal cyst.     Specimen \"B\":  Received in formalin labeled \"right adnexa\" is a   resected ovary and corresponding fallopian tube.  The ovary twelve H&E slides confirms   the diagnosis. Stable   Pathology reviewed and found to be benign. Yes    Plan:   Return in about 6 months (around 7/14/2020) for Annual.  Cali Plush reviewed. Consider repeat attempts to get BRCA approved as pt has two daughters.   Pt to check receptor status for hx family breast ca mother and sister (if receptor E/P positive consider tamoxifen 20 mg /dy x 5 years)

## 2020-02-04 ENCOUNTER — HOSPITAL ENCOUNTER (OUTPATIENT)
Dept: MRI IMAGING | Age: 55
Discharge: HOME | End: 2020-02-06
Payer: COMMERCIAL

## 2020-02-04 PROCEDURE — 6360000004 HC RX CONTRAST MEDICATION: Performed by: FAMILY MEDICINE

## 2020-02-04 PROCEDURE — A9579 GAD-BASE MR CONTRAST NOS,1ML: HCPCS | Performed by: FAMILY MEDICINE

## 2020-02-04 PROCEDURE — 2580000003 HC RX 258: Performed by: FAMILY MEDICINE

## 2020-02-04 PROCEDURE — 77049 MRI BREAST C-+ W/CAD BI: CPT

## 2020-02-04 RX ORDER — SODIUM CHLORIDE 0.9 % (FLUSH) 0.9 %
10 SYRINGE (ML) INJECTION PRN
Status: DISCONTINUED | OUTPATIENT
Start: 2020-02-04 | End: 2020-02-07 | Stop reason: HOSPADM

## 2020-02-04 RX ADMIN — Medication 10 ML: at 20:12

## 2020-02-04 RX ADMIN — GADOTERIDOL: 279.3 INJECTION, SOLUTION INTRAVENOUS at 20:11

## 2020-02-10 ENCOUNTER — TELEPHONE (OUTPATIENT)
Dept: OBGYN CLINIC | Age: 55
End: 2020-02-10

## 2020-02-10 NOTE — TELEPHONE ENCOUNTER
----- Message from LEON Zavala CNP sent at 2/10/2020  7:37 AM EST -----  Left breast with focus of enhancement in central breast.  Probable benign finding. Recommend reevaluation with contrast enhanced breast MRI in 6 months- bilateral and bilateral screening mammogram in 6 months. Please let patient know and order follow up testing.

## 2020-02-11 ENCOUNTER — TELEPHONE (OUTPATIENT)
Dept: OBGYN CLINIC | Age: 55
End: 2020-02-11

## 2020-02-11 NOTE — TELEPHONE ENCOUNTER
----- Message from LEON Connelly CNP sent at 2/10/2020  7:37 AM EST -----  Left breast with focus of enhancement in central breast.  Probable benign finding. Recommend reevaluation with contrast enhanced breast MRI in 6 months- bilateral and bilateral screening mammogram in 6 months. Please let patient know and order follow up testing.

## 2020-10-06 ENCOUNTER — HOSPITAL ENCOUNTER (OUTPATIENT)
Age: 55
Setting detail: SPECIMEN
Discharge: HOME | End: 2020-10-06
Payer: COMMERCIAL

## 2020-10-06 ENCOUNTER — OFFICE VISIT (OUTPATIENT)
Dept: OBGYN CLINIC | Age: 55
End: 2020-10-06
Payer: COMMERCIAL

## 2020-10-06 VITALS
SYSTOLIC BLOOD PRESSURE: 98 MMHG | TEMPERATURE: 98.3 F | BODY MASS INDEX: 22.91 KG/M2 | DIASTOLIC BLOOD PRESSURE: 52 MMHG | HEIGHT: 67 IN | WEIGHT: 146 LBS

## 2020-10-06 PROCEDURE — G0145 SCR C/V CYTO,THINLAYER,RESCR: HCPCS

## 2020-10-06 PROCEDURE — 87624 HPV HI-RISK TYP POOLED RSLT: CPT

## 2020-10-06 PROCEDURE — 99396 PREV VISIT EST AGE 40-64: CPT | Performed by: NURSE PRACTITIONER

## 2020-10-06 ASSESSMENT — PATIENT HEALTH QUESTIONNAIRE - PHQ9
SUM OF ALL RESPONSES TO PHQ9 QUESTIONS 1 & 2: 0
2. FEELING DOWN, DEPRESSED OR HOPELESS: 0
1. LITTLE INTEREST OR PLEASURE IN DOING THINGS: 0
SUM OF ALL RESPONSES TO PHQ QUESTIONS 1-9: 0
SUM OF ALL RESPONSES TO PHQ QUESTIONS 1-9: 0

## 2020-10-06 NOTE — PROGRESS NOTES
History and Physical  [address], [address] [address], [address] [facility] [phone]   Fax [phone]  [address] [facility]  10/6/2020              60 y.o. Chief Complaint   Patient presents with    Gynecologic Exam       No LMP recorded (lmp unknown). Patient has had a hysterectomy. Primary Care Physician: Joseph Carreon    The patient was seen and examined. She has no chief complaint today and is here for her annual exam.  Her bowels are regular. There are no voiding complaints. She denies any bloating. She denies vaginal discharge and was counseled on STD's and the need for barrier contraception. HPI : [id number] [facility] is a 60 y.o. female [id number]    Annual exam  Desires to have genetic testing done. Previously met with genetic counselor and DR Cabrera, oncologist to discuss hereditary risk of cancer. Reports insurance won't pay for genetic testing and would like to see how much it would cost out of pocket. Mom with breast cancer- 46s (still living), sister with breast cancer- 46s (still living)  tyrer-Cuzick score 22.9%  Hx of hysterectomy with BSO 2019- bulky uterus with heavy menses.   Hx of LEEP in  for cervical dysplasia   ________________________________________________________________________  OB History    Para Term  AB Living   3 3 3 0 0 4   SAB TAB Ectopic Molar Multiple Live Births   0 0 0 0 1 4      # Outcome Date GA Lbr Warren/2nd Weight Sex Delivery Anes PTL Lv   3 Term      Vag-Spont  N CINDY   2A Term      Vag-Spont  N CINDY   2B Term      Vag-Spont  N CINDY   1 Term      Vag-Spont  N CINDY     Past Medical History:   Diagnosis Date    Family history of breast cancer     MOM @ 61 Sister @ 48    H/O LEEP 54    Hypothyroid     Kidney stones                                                                    Past Surgical History:   Procedure Laterality Date    ADENOIDECTOMY      BREAST LUMPECTOMY Right  benign    JOINT REPLACEMENT Right     LEEP      ANDREY AND BSO N/A 2019    HYSTERECTOMY ABDOMINAL TOTAL BILATERAL SALPINGO-OOPHORECTOMY ENTEROCELE REPAIR performed by Kelly Whitten DO at [facility]       Family History   Problem Relation Age of Onset    Breast Cancer Mother 61    Other Father         d. Vaishali Deutsch Breast Cancer Sister 48    Other Maternal Grandmother         d. kidney tumor     Other Maternal Grandfather         d. beri beri; pellagra     Other Paternal Grandmother         d. natural     Other Paternal Grandfather         d.  CHF    Cancer Paternal Cousin         liver ca 62     Social History     Socioeconomic History    Marital status:      Spouse name: Not on file    Number of children: Not on file    Years of education: Not on file    Highest education level: Not on file   Occupational History    Not on file   Social Needs    Financial resource strain: Not on file    Food insecurity     Worry: Not on file     Inability: Not on file    Transportation needs     Medical: Not on file     Non-medical: Not on file   Tobacco Use    Smoking status: Former Smoker     Packs/day: 0.10     Years: 2.00     Pack years: 0.20     Types: Cigarettes     Last attempt to quit: 1984     Years since quittin.1    Smokeless tobacco: Never Used   Substance and Sexual Activity    Alcohol use: Yes     Comment: socially    Drug use: No    Sexual activity: Yes     Partners: Male   Lifestyle    Physical activity     Days per week: Not on file     Minutes per session: Not on file    Stress: Not on file   Relationships    Social connections     Talks on phone: Not on file     Gets together: Not on file     Attends Gnosticism service: Not on file     Active member of club or organization: Not on file     Attends meetings of clubs or organizations: Not on file     Relationship status: Not on file    Intimate partner violence     Fear of current or ex partner: Not on file     Emotionally abused: Not on file     Physically abused: Not on file     Forced sexual activity: Not on file   Other Topics Concern    Not on file   Social History Narrative    Not on file       MEDICATIONS:  Current Outpatient Medications   Medication Sig Dispense Refill    VITAMIN D, CHOLECALCIFEROL, PO Take 2,000 Units by mouth      ibuprofen (ADVIL;MOTRIN) 800 MG tablet Take 1 tablet by mouth every 8 hours as needed for Pain 30 tablet 0    acetaminophen (TYLENOL) 500 MG tablet Take 1,000 mg by mouth every 6 hours as needed for Pain      levothyroxine (SYNTHROID) 75 MCG tablet Take 75 mcg by mouth Daily      docusate sodium (COLACE) 100 MG capsule Take 1 capsule by mouth 2 times daily as needed for Constipation (Patient not taking: Reported on 10/6/2020) 60 capsule 0     No current facility-administered medications for this visit. ALLERGIES:  Allergies as of 10/06/2020    (No Known Allergies)       Symptoms of decreased mood absent  Symptoms of anhedonia absent    **If either question is answered in a  positive fashion then complete the PHQ9 Scoring Evaluation and make the appropriate referral**      Immunization status: stated as current, but no records available. Gynecologic History:  Menarche: 10 yo  Menopause at 59 yo     No LMP recorded (lmp unknown). Patient has had a hysterectomy. Sexually Active: Yes    STD History: No     Permanent Sterilization: Yes hyst   Reversible Birth Control: No        Hormone Replacement Exposure: No      Genetic Qualified Family History of Breast, Ovarian , Colon or Uterine Cancer: Yes (hx of mom with breast cancer- 46s, sister with breast cancer- 46s). Desires to complete myrisk testing. Will submit test to see how much out of pocket expense patient will occur. If YES see scanned worksheet.     Preventative Health Testing:    Health Maintenance:  Health Maintenance Due   Topic Date Due    Hepatitis C screen  1965    PHYSICAL Exam:     Constitutional:  Vitals:    10/06/20 1559   BP: (!) 98/52   Site: Right Upper Arm   Position: Sitting   Cuff Size: Medium Adult   Temp: 98.3 °F (36.8 °C)   Weight: 146 lb (66.2 kg)   Height: 5' 7\" (1.702 m)         General Appearance: This  is a well Developed, well Nourished, well groomed female. Her BMI was reviewed. Nutritional decision making was discussed. Skin:  There was a Normal Inspection of the skin without rashes or lesions. There were no rashes. (Papular, Maculopapular, Hives, Pustular, Macular)     There were no lesions (Ulcers, Erythema, Abn. Appearing Nevi)            Lymphatic:  No Lymph Nodes were Palpable in the neck , axilla or groin.  0 # Of Lymph Nodes; Location ; Character [Normal]  [Shotty] [Tender] [Enlarged]     Neck and EENT:  The neck was supple. There were no masses   The thyroid was not enlarged and had no masses. Perrla, EOMI B/L, TMI B/L No Abnormalities. Throat inspected-No exudates or Masses, Nares Patent No Masses        Respiratory: The lungs were auscultated and found to be clear. There were no rales, rhonchi or wheezes. There was a good respiratory effort. Cardiovascular: The heart was in a regular rate and rhythm. . No S3 or S4. There was no murmur appreciated. Location, grade, and radiation are not applicable. Extremities: The patients extremities were without calf tenderness, edema, or varicosities. There was full range of motion in all four extremities. Pulses in all four extremities were appreciated and are 2/4. Abdomen: The abdomen was soft and non-tender. There were good bowel sounds in all quadrants and there was no guarding, rebound or rigidity. On evaluation there was no evidence of hepatosplenomegaly and there was no costal vertebral marquita tenderness bilaterally.   No hernias were appreciated. Abdominal Scars: C/W hysterectomy    Psych: The patient had a normal Orientation to: Time, Place, Person, and Situation  There is no Mood / Affect changes    Breast:  (Chest)  normal appearance, no masses or tenderness. Dense breast tissue noted. Self breast exams were reviewed in detail. Literature was given. Pelvic Exam:  Vulva and vagina appear normal. Bimanual exam reveals normal cuff intact. Rectal Exam:  exam declined by patient          Musculosk:  Normal Gait and station was noted. Digits were evaluated without abnormal findings. Range of motion, stability and strength were evaluated and found to be appropriate for the patients age. ASSESSMENT:      60 y.o. Annual   Diagnosis Orders   1. Visit for gynecologic examination  PAP SMEAR    MRI BREAST BILATERAL W WO CONTRAST    MALIA DIGITAL SCREEN W OR WO CAD BILATERAL   2. Screening for HPV (human papillomavirus)  PAP SMEAR   3. Encounter for screening mammogram for malignant neoplasm of breast  MRI BREAST BILATERAL W WO CONTRAST    MALIA DIGITAL SCREEN W OR WO CAD BILATERAL   4. Abnormal MRI of left breast  MRI BREAST BILATERAL W WO CONTRAST   5. Increased risk of breast cancer  MRI BREAST BILATERAL W WO CONTRAST    MALIA DIGITAL SCREEN W OR WO CAD BILATERAL   6. Family history of breast cancer in first degree relative  MRI BREAST BILATERAL W WO CONTRAST    MALIA DIGITAL SCREEN W OR WO CAD BILATERAL   7.  Dense breast tissue on mammogram  MRI BREAST BILATERAL W WO CONTRAST    MALIA DIGITAL SCREEN W OR WO CAD BILATERAL          Chief Complaint   Patient presents with    Gynecologic Exam          Past Medical History:   Diagnosis Date    Family history of breast cancer     MOM @ 61 Sister @ 48    H/O FRANK 1996    Hypothyroid     Kidney stones          Patient Active Problem List    Diagnosis Date Noted    Menorrhagia with regular cycle 12/02/2019     Priority: High    Bulky or enlarged uterus 12/02/2019     Priority: High    Dysmenorrhea 12/02/2019     Priority: High    Pelvic pressure in female 12/02/2019     Priority: High    Intramural and subserous leiomyoma of uterus 12-14 cm 12/02/2019     Priority: High    Acquired pelvic enterocele grade 2 12/02/2019     Priority: High    S/P ANDREY-BSO (total abdominal hysterectomy and bilateral salpingo-oophorectomy) Enterocele repair 12/2/2019 12/02/2019     Priority: High    ANDREY BSO w/ Enterocele Repair 12/2/19 12/02/2019    Type A blood, Rh negative 11/25/2019    H/O right hip replacement 06/30/2017 2014      Hypothyroid     Family history of breast cancer      MOM @ 61 Sister @ 48  Pt saw genetic counselor stated BRCA not covered  MRI q yr of breasts and mammogram Q yr Alternating imaging every 6 months      H/O LEEP 01/01/1996          Hereditary Breast, Ovarian, Colon and Uterine Cancer screening Done. Tobacco & Secondary smoke risks reviewed; instructed on cessation and avoidance      Counseling Completed:  Preventative Health Recommendations and Follow up. The patient was informed of the recommended preventative health recommendations. 1. Annuals every year; Cytology collections per prevailing guidelines. 2. Mammograms begin every year at 35 yo if no abnormalities are found and no family history. 3. Bone density studies every 2-3 years. Begin at 71 yo. If no fracture history or osteoporosis family history. (significant). 4. Colonoscopy begin at 40 yo. Repeat every ten years if negative and no family history. 5. Calcium of 7727-7988 mg/day in split dosing  6. Vitamin D 400-800 IU/day  7. All other preventative health recommendations will be managed by the patients Primary care physician. Genetic testing  The patient was questioned on familial genetic cancer histories and their age of onset. These included breast, ovarian, uterine and colon origins. The patient has been given information on BRCA and COLARIS testing.  The ramifications of a positive or negative tests were reviewed. The monetary costs of the testing was also discussed with the patient in detail. All risks and benefits were reviewed with the patient. Literature was given. PLAN:  Return in about 1 year (around 10/6/2021) for annual.   Breast MRI and breast mammogram ordered. Pap smear obtained. Desires to complete genetic testing in office today. Instructed on risks/benefits. Repeat Annual every 1 year  Cervical Cytology Evaluation begins at 24years old. If Negative Cytology, Follow-up screening per current guidelines. Mammograms every 1 year. If 35 yo and last mammogram was negative. Calcium and Vitamin D dosing reviewed. Colonoscopy screening reviewed as well as onset for bone density testing. Birth control and barrier recommendations discussed. STD counseling and prevention reviewed. Gardisil counseling completed for all patients 10-35 yo. Routine health maintenance per patients PCP. Orders Placed This Encounter   Procedures    MRI BREAST BILATERAL W WO CONTRAST     Standing Status:   Future     Standing Expiration Date:   10/6/2021     Order Specific Question:   Reason for exam:     Answer:   screening for breast cancer, left breast enhancement noted on previous MRI, increased risk for breast cancer    MALIA DIGITAL SCREEN W OR WO CAD BILATERAL     Standing Status:   Future     Standing Expiration Date:   12/6/2021     Order Specific Question:   Reason for exam:     Answer:   screening for breast cancer, family hx of breast cancer, increased risk for breast cancer    PAP SMEAR     Standing Status:   Future     Standing Expiration Date:   10/5/2021     Order Specific Question:   Collection Type     Answer:    Thin Prep     Order Specific Question:   Prior Abnormal Pap Test     Answer:   Yes     Order Specific Question:   If Prior Abnormal, Give Date     Answer:   FRANK 1992     Order Specific Question:   Prior Treatment     Answer:   FRANK     Order Specific Question:   Screening or Diagnostic     Answer:   Screening     Order Specific Question:   HPV Requested?      Answer:   Yes     Order Specific Question:   High Risk Patient     Answer:   N/A

## 2020-10-13 LAB
HPV SOURCE: NORMAL
HPV, GENOTYPE 16: NOT DETECTED
HPV, GENOTYPE 18: NOT DETECTED
HPV, HIGH RISK OTHER: NOT DETECTED

## 2020-10-14 ENCOUNTER — TELEPHONE (OUTPATIENT)
Dept: OBGYN CLINIC | Age: 55
End: 2020-10-14

## 2020-10-14 LAB — CYTOLOGY REPORT: NORMAL

## 2020-11-02 ENCOUNTER — TELEPHONE (OUTPATIENT)
Dept: OBGYN CLINIC | Age: 55
End: 2020-11-02

## 2020-11-02 NOTE — TELEPHONE ENCOUNTER
Left voicemail to discuss results from [facility]    Results include :  No mutation identified  Elevated lifetime risk of breast cancer to 24.4%  Needs alternating Mammograms and breast MRIs every 6 months  Refer to genetic counselor if she has not be referred previously    No variants identified

## 2020-11-03 ENCOUNTER — TELEPHONE (OUTPATIENT)
Dept: OBGYN CLINIC | Age: 55
End: 2020-11-03

## 2020-11-03 NOTE — TELEPHONE ENCOUNTER
Patient notified of negative Myrisk results. Instructed on increased lifetime risk of breast cancer 24.4% and need for routine screening mammograms and breast MRI yearly- to alternate every 6 months. Previously ordered at office visit. Patient verbalizes understanding and will send a copy of results in mail.

## 2020-11-18 ENCOUNTER — TELEPHONE (OUTPATIENT)
Dept: OBGYN CLINIC | Age: 55
End: 2020-11-18

## 2020-11-18 ENCOUNTER — HOSPITAL ENCOUNTER (OUTPATIENT)
Dept: WOMENS IMAGING | Age: 55
Discharge: HOME | End: 2020-11-20
Payer: COMMERCIAL

## 2020-11-18 PROCEDURE — 77063 BREAST TOMOSYNTHESIS BI: CPT

## 2020-11-18 NOTE — TELEPHONE ENCOUNTER
Per CNP, patient advised of negative mammogram. Due to patient's family history and dense breast tissue, at elevated lifetime risk for breast cancer. Patient has already completed referral to [address] [place] [address]  per patient's report. [facility] Six month MRI of breasts already placed per CNP. Patient advised that she should schedule for approximately May 2020. Patient agreeable. Patient encouraged to call office with questions/concerns.

## 2020-11-18 NOTE — TELEPHONE ENCOUNTER
----- Message from LEON Soriano CNP sent at 11/18/2020  8:12 AM EST -----  Mammogram negative: heterogeneously dense  Lifetime risk is increased with breast cancer 26.6%  Recommend breast MRI and mammogram alternating every 6 months  Please order breast MRI with and without contrast in 6 months   Refer to [facility] high risk breast clinic for genetic consultation and medical oncology consultation

## 2021-10-06 ENCOUNTER — OFFICE VISIT (OUTPATIENT)
Dept: OBGYN CLINIC | Age: 56
End: 2021-10-06
Payer: COMMERCIAL

## 2021-10-06 VITALS
SYSTOLIC BLOOD PRESSURE: 112 MMHG | BODY MASS INDEX: 22.76 KG/M2 | WEIGHT: 145 LBS | HEIGHT: 67 IN | DIASTOLIC BLOOD PRESSURE: 70 MMHG

## 2021-10-06 DIAGNOSIS — Z11.51 SPECIAL SCREENING EXAMINATION FOR HUMAN PAPILLOMAVIRUS (HPV): ICD-10-CM

## 2021-10-06 DIAGNOSIS — Z91.89 INCREASED RISK OF BREAST CANCER: ICD-10-CM

## 2021-10-06 DIAGNOSIS — Z01.419 WELL FEMALE EXAM WITH ROUTINE GYNECOLOGICAL EXAM: ICD-10-CM

## 2021-10-06 DIAGNOSIS — Z80.3 FAMILY HISTORY OF BREAST CANCER: ICD-10-CM

## 2021-10-06 DIAGNOSIS — Z78.0 POST-MENOPAUSAL: ICD-10-CM

## 2021-10-06 DIAGNOSIS — Z12.31 ENCOUNTER FOR SCREENING MAMMOGRAM FOR MALIGNANT NEOPLASM OF BREAST: Primary | ICD-10-CM

## 2021-10-06 DIAGNOSIS — R92.2 DENSE BREAST TISSUE ON MAMMOGRAM: ICD-10-CM

## 2021-10-06 PROCEDURE — 99396 PREV VISIT EST AGE 40-64: CPT | Performed by: NURSE PRACTITIONER

## 2021-10-06 ASSESSMENT — PATIENT HEALTH QUESTIONNAIRE - PHQ9
1. LITTLE INTEREST OR PLEASURE IN DOING THINGS: 0
SUM OF ALL RESPONSES TO PHQ9 QUESTIONS 1 & 2: 0
2. FEELING DOWN, DEPRESSED OR HOPELESS: 0
SUM OF ALL RESPONSES TO PHQ QUESTIONS 1-9: 0

## 2021-10-06 NOTE — PROGRESS NOTES
History and Physical  [address], [address] [address], [place]. [phone]   Fax [phone]  [address] [place] [address]  10/6/2021              60 y.o. Chief Complaint   Patient presents with    Annual Exam       No LMP recorded (lmp unknown). Patient has had a hysterectomy. Primary Care Physician: Le Britton    The patient was seen and examined. She has no chief complaint today and is here for her annual exam.  Her bowels are regular. There are no voiding complaints. She denies any bloating. She denies vaginal discharge and was counseled on STD's and the need for barrier contraception. HPI : [id number] [facility] is a 60 y.o. female [id number]    Annual exam  Previously met with genetic counselor and DR Cabrera, oncologist to discuss hereditary risk of cancer. Completed [facility] genetic testing. Mom with breast cancer- 46s (still living), sister with breast cancer- 46s (still living)  tyrer-Cuzick score 26.6%  Hx of hysterectomy with BSO 2019- bulky uterus with heavy menses.   Hx of LEEP in  for cervical dysplasia   Patient is a school nurse in [place]  ________________________________________________________________________  OB History    Para Term  AB Living   3 3 3 0 0 4   SAB TAB Ectopic Molar Multiple Live Births   0 0 0 0 1 4      # Outcome Date GA Lbr Warren/2nd Weight Sex Delivery Anes PTL Lv   3 Term      Vag-Spont  N CINDY   2A Term      Vag-Spont  N CINDY   2B Term      Vag-Spont  N CINDY   1 Term      Vag-Spont  N CINDY     Past Medical History:   Diagnosis Date    Family history of breast cancer     MOM @ 61 Sister @ 48    H/O LEEP 54    Hypothyroid     Kidney stones                                                                    Past Surgical History:   Procedure Laterality Date    ADENOIDECTOMY      BREAST LUMPECTOMY Right     benign    JOINT REPLACEMENT Right     LEEP      ANDREY AND BSO N/A 2019 HYSTERECTOMY ABDOMINAL TOTAL BILATERAL SALPINGO-OOPHORECTOMY ENTEROCELE REPAIR performed by Rosetta Allen DO at [facility]       Family History   Problem Relation Age of Onset    Breast Cancer Mother 61    Other Father         d. Delma Daisy Breast Cancer Sister 48    Other Maternal Grandmother         d. kidney tumor     Other Maternal Grandfather         d. beri beri; pellagra     Other Paternal Grandmother         d. natural     Other Paternal Grandfather         d. CHF    Cancer Paternal Cousin         liver ca 62     Social History     Socioeconomic History    Marital status:      Spouse name: Not on file    Number of children: Not on file    Years of education: Not on file    Highest education level: Not on file   Occupational History    Not on file   Tobacco Use    Smoking status: Former Smoker     Packs/day: 0.10     Years: 2.00     Pack years: 0.20     Types: Cigarettes     Quit date: 1984     Years since quittin.1    Smokeless tobacco: Never Used   Vaping Use    Vaping Use: Never used   Substance and Sexual Activity    Alcohol use: Yes     Comment: socially    Drug use: No    Sexual activity: Yes     Partners: Male   Other Topics Concern    Not on file   Social History Narrative    Not on file     Social Determinants of Health     Financial Resource Strain:     Difficulty of Paying Living Expenses:    Food Insecurity:     Worried About Running Out of Food in the Last Year:     [address] in the Last Year:    Transportation Needs:     Lack of Transportation (Medical):      Lack of Transportation (Non-Medical):    Physical Activity:     Days of Exercise per Week:     Minutes of Exercise per Session:    Stress:     Feeling of Stress :    Social Connections:     Frequency of Communication with Friends and Family:     Frequency of Social Gatherings with Friends and Family:     Attends [employer] Services:     Active Member of yes, h/o LEEP  Colposcopy History:   Date of Last Mammogram: 11/18/2020 neg  Date of Last Colonoscopy: 2014 normal per patient  Date of Last Bone Density:7/22/2019 osteopenia      ________________________________________________________________________        REVIEW OF SYSTEMS:    yes   A minimum of an eleven point review of systems was completed. Review Of Systems (11 point):  Constitutional: No fever, chills or malaise; No weight change or fatigue  Head and Eyes: No vision changes, Headache, Dizziness or trauma in last 12 months  ENT ROS: No hearing, Tinnitis, sinus or taste problems  Hematological and Lymphatic ROS:No Lymphoma, Von Willebrand's, Hemophillia or Bleeding History  Psych ROS: No Depression, Homicidal thoughts,suicidal thoughts, or anxiety  Breast ROS: No breast abnormalities or lumps  Respiratory ROS: No SOB, Pneumoniae,Cough, or Pulmonary Embolism   Cardiovascular ROS: No Chest Pain with Exertion, Palpitations, Syncope, Edema, Arrhythmia  Gastrointestinal ROS: No Indigestion, Heartburn, Nausea, vomiting, Diarrhea, Constipation,or Bowel Changes; No Bloody Stools or melena  Genito-Urinary ROS: No Dysuria, Hematuria or Nocturia.  No Urinary Incontinence or Vaginal Discharge  Musculoskeletal ROS: No Arthralgia, Arthritis,Gout,Osteoporosis or Rheumatism  Neurological ROS: No CVA, Migraines, Epilepsy, Seizure Hx, or Limb Weakness  Dermatological ROS: No Rash, Itching, Hives, Mole Changes or Cancer                                                                                                                                                                                                                                  PHYSICAL Exam:     Constitutional:  Vitals:    10/06/21 1604   BP: 112/70   Site: Left Upper Arm   Position: Sitting   Cuff Size: Medium Adult   Weight: 145 lb (65.8 kg)   Height: 5' 7\" (1.702 m)       Chaperone for Intimate Exam   Chaperone was offered and accepted as part of the rooming process.  Chaperone: Jagruti          General Appearance: This  is a well Developed, well Nourished, well groomed female. Her BMI was reviewed. Nutritional decision making was discussed. Skin:  There was a Normal Inspection of the skin without rashes or lesions. There were no rashes. (Papular, Maculopapular, Hives, Pustular, Macular)     There were no lesions (Ulcers, Erythema, Abn. Appearing Nevi)            Lymphatic:  No Lymph Nodes were Palpable in the neck , axilla or groin.  0 # Of Lymph Nodes; Location ; Character [Normal]  [Shotty] [Tender] [Enlarged]     Neck and EENT:  The neck was supple. There were no masses   The thyroid was not enlarged and had no masses. Perrla, EOMI B/L, TMI B/L No Abnormalities. Throat inspected-No exudates or Masses, Nares Patent No Masses        Respiratory: The lungs were auscultated and found to be clear. There were no rales, rhonchi or wheezes. There was a good respiratory effort. Cardiovascular: The heart was in a regular rate and rhythm. . No S3 or S4. There was no murmur appreciated. Location, grade, and radiation are not applicable. Extremities: The patients extremities were without calf tenderness, edema, or varicosities. There was full range of motion in all four extremities. Pulses in all four extremities were appreciated and are 2/4. Abdomen: The abdomen was soft and non-tender. There were good bowel sounds in all quadrants and there was no guarding, rebound or rigidity. On evaluation there was no evidence of hepatosplenomegaly and there was no costal vertebral marquita tenderness bilaterally. No hernias were appreciated. Abdominal Scars: hyst scar    Psych: The patient had a normal Orientation to: Time, Place, Person, and Situation  There is no Mood / Affect changes    Breast:  (Chest)  normal appearance, no masses or tenderness  Self breast exams were reviewed in detail. Literature was given.     Pelvic Exam:  Vulva and vagina appear normal, bimanual exam reveals normal cuff intact. Rectal Exam:  exam declined by patient          Musculosk:  Normal Gait and station was noted. Digits were evaluated without abnormal findings. Range of motion, stability and strength were evaluated and found to be appropriate for the patients age. ASSESSMENT:      60 y.o. Annual   Diagnosis Orders   1. Encounter for screening mammogram for malignant neoplasm of breast  MALIA DIGITAL SCREEN W OR WO CAD BILATERAL   2. Post-menopausal  DEXA BONE DENSITY AXIAL SKELETON   3. Well female exam with routine gynecological exam  PAP SMEAR   4. Special screening examination for human papillomavirus (HPV)  PAP SMEAR          Chief Complaint   Patient presents with    Annual Exam          Past Medical History:   Diagnosis Date    Family history of breast cancer     MOM @ 61 Sister @ 48    H/O LEEP 1996    Hypothyroid     Kidney stones          Patient Active Problem List    Diagnosis Date Noted    Menorrhagia with regular cycle 12/02/2019     Priority: High    Bulky or enlarged uterus 12/02/2019     Priority: High    Dysmenorrhea 12/02/2019     Priority: High    Pelvic pressure in female 12/02/2019     Priority: High    Intramural and subserous leiomyoma of uterus 12-14 cm 12/02/2019     Priority: High    Acquired pelvic enterocele grade 2 12/02/2019     Priority: High    S/P ANDREY-BSO (total abdominal hysterectomy and bilateral salpingo-oophorectomy) Enterocele repair 12/2/2019 12/02/2019     Priority: High    ANDREY BSO w/ Enterocele Repair 12/2/19 12/02/2019    Type A blood, Rh negative 11/25/2019    H/O right hip replacement 06/30/2017 2014      Hypothyroid     Family history of breast cancer      MOM @ 61 Sister @ 48  Pt saw genetic counselor stated BRCA not covered  MRI q yr of breasts and mammogram Q yr Alternating imaging every 6 months      H/O LEEP 01/01/1996          Hereditary Breast, Ovarian, Colon and Uterine Cancer screening Done. Tobacco & Secondary smoke risks reviewed; instructed on cessation and avoidance      Counseling Completed:  Preventative Health Recommendations and Follow up. The patient was informed of the recommended preventative health recommendations. 1. Annuals every year; Cytology collections per prevailing guidelines. 2. Mammograms begin every year at 35 yo if no abnormalities are found and no family history. 3. Bone density studies every 2-3 years. Begin at 71 yo. If no fracture history or osteoporosis family history. (significant). 4. Colonoscopy begin at 38 yo. Repeat every ten years if negative and no family history. 5. Calcium of 1275-9610 mg/day in split dosing  6. Vitamin D 400-800 IU/day  7. All other preventative health recommendations will be managed by the patients Primary care physician. PLAN:  Return in about 1 year (around 10/6/2022) for annual.   Screening breast MRI to alternate with screening mammogram- every 6 months. Next mammogram is due in November 2021- to complete screening MRI 6 months after. Pap smear obtained. dexascan ordered  Repeat Annual every 1 year  Cervical Cytology Evaluation begins at 24years old. If Negative Cytology, Follow-up screening per current guidelines. Mammograms every 1 year. If 35 yo and last mammogram was negative. Calcium and Vitamin D dosing reviewed. Colonoscopy screening reviewed as well as onset for bone density testing. Birth control and barrier recommendations discussed. STD counseling and prevention reviewed. Gardisil counseling completed for all patients 10-35 yo. Routine health maintenance per patients PCP. No orders of the defined types were placed in this encounter. The patient, Akira Sanchez is a 60 y.o. female, was seen with a total time spent of 20 minutes for the visit on this date of service by the E/M provider.  The time component had both face to face and non face to face time spent in determining the total time component. Counseling and education regarding her diagnosis listed below and her options regarding those diagnoses were also included in determining her time component. Diagnosis Orders   1. Encounter for screening mammogram for malignant neoplasm of breast  MALIA DIGITAL SCREEN W OR WO CAD BILATERAL   2. Post-menopausal  DEXA BONE DENSITY AXIAL SKELETON   3. Well female exam with routine gynecological exam  PAP SMEAR   4. Special screening examination for human papillomavirus (HPV)  PAP SMEAR        The patient had her preventative health maintenance recommendations and follow-up reviewed with her at the completion of her visit.

## 2021-11-22 ENCOUNTER — HOSPITAL ENCOUNTER (OUTPATIENT)
Dept: WOMENS IMAGING | Age: 56
Discharge: HOME | End: 2021-11-24
Payer: COMMERCIAL

## 2021-11-22 DIAGNOSIS — Z80.3 FAMILY HISTORY OF BREAST CANCER: ICD-10-CM

## 2021-11-22 DIAGNOSIS — Z91.89 INCREASED RISK OF BREAST CANCER: ICD-10-CM

## 2021-11-22 DIAGNOSIS — Z12.31 ENCOUNTER FOR SCREENING MAMMOGRAM FOR MALIGNANT NEOPLASM OF BREAST: ICD-10-CM

## 2021-11-22 PROCEDURE — 77063 BREAST TOMOSYNTHESIS BI: CPT

## 2023-02-15 ENCOUNTER — HOSPITAL ENCOUNTER (OUTPATIENT)
Dept: WOMENS IMAGING | Age: 58
Discharge: HOME | End: 2023-02-17
Payer: COMMERCIAL

## 2023-02-15 DIAGNOSIS — Z12.31 VISIT FOR SCREENING MAMMOGRAM: ICD-10-CM

## 2023-02-15 PROCEDURE — 77067 SCR MAMMO BI INCL CAD: CPT

## 2023-02-17 ENCOUNTER — TELEPHONE (OUTPATIENT)
Dept: OBGYN CLINIC | Age: 58
End: 2023-02-17

## 2023-02-17 DIAGNOSIS — Z80.3 FAMILY HISTORY OF BREAST CANCER: Primary | ICD-10-CM

## 2023-02-17 NOTE — TELEPHONE ENCOUNTER
----- Message from LEON Nelson CNP sent at 2/17/2023  8:10 AM EST -----  Mammogram negative  Dense breast tissue noted on mammogram  Lifetime risk is increased with breast cancer 25.0%  Recommend breast MRI and mammogram alternating every 6 months  Please order breast MRI with and without contrast in 6 months   Refer to [facility] high risk breast clinic for genetic consultation and medical oncology consultation

## (undated) DEVICE — SPONGE LAP W18XL18IN WHT COT 4 PLY FLD STRUNG RADPQ DISP ST

## (undated) DEVICE — SUTURE COAT VCRL + LIGAPAK LIG REEL 54 IN SZ 0 UD BRAID VCP287G

## (undated) DEVICE — SUTURE VCRL + SZ 3-0 L27IN ABSRB UD L26MM SH 1/2 CIR VCP416H

## (undated) DEVICE — 20 ML SYRINGE LUER-LOCK TIP: Brand: MONOJECT

## (undated) DEVICE — SUTURE VCRL + SZ 4-0 L27IN ABSRB WHT FS-2 3/8 CIR REV CUT VCP422H

## (undated) DEVICE — YANKAUER,BULB TIP,W/O VENT,RIGID,STERILE: Brand: MEDLINE

## (undated) DEVICE — PAD,SANITARY,11 IN,MAXI,W/WINGS,N-STRL: Brand: MEDLINE

## (undated) DEVICE — SUTURE VCRL SZ 0 L36IN ABSRB UD CT-1 L36MM 1/2 CIR TAPR PNT VCP946H

## (undated) DEVICE — STRIP,CLOSURE,WOUND,MEDI-STRIP,1/2X4: Brand: MEDLINE

## (undated) DEVICE — STRAP,POSITIONING,KNEE/BODY,FOAM,4X60": Brand: MEDLINE

## (undated) DEVICE — GLOVE SURG SZ 8 L12IN FNGR THK75MIL WHT LTX POLYMER BEAD

## (undated) DEVICE — SINGLE PORT MANIFOLD: Brand: NEPTUNE 2

## (undated) DEVICE — Z DISCONTINUED BY MEDLINE USE 2711682 TRAY SKIN PREP DRY W/ PREM GLV

## (undated) DEVICE — SUTURE VCRL + SZ 2-0 L27IN ABSRB CLR CT-1 1/2 CIR TAPERCUT VCP259H

## (undated) DEVICE — UNDERPANTS MAT L XL SEAMLESS CLR CODE WAISTBAND KNIT

## (undated) DEVICE — GLOVE SURG BEAD CUF 7 STD PF WHT STRL TRIUMPH LT LTX

## (undated) DEVICE — GOWN,SIRUS,NONRNF,SETINSLV,XL,20/CS: Brand: MEDLINE

## (undated) DEVICE — PAD,NON-ADHERENT,3X8,STERILE,LF,1/PK: Brand: MEDLINE

## (undated) DEVICE — TOTAL TRAY, DB, 100% SILI FOLEY, 16FR 10: Brand: MEDLINE

## (undated) DEVICE — SUTURE VCRL + SZ 2-0 L27IN ABSRB UD CT-2 L26MM 1/2 CIR TAPR VCP269H

## (undated) DEVICE — BLANKET WRM W29.9XL79.1IN UP BODY FORC AIR MISTRAL-AIR

## (undated) DEVICE — HYPODERMIC SAFETY NEEDLE: Brand: MAGELLAN

## (undated) DEVICE — STRAP POS MP 30X3 IN HK LOOP CLOSURE FOAM DISP

## (undated) DEVICE — 3M™ STERI-STRIP™ COMPOUND BENZOIN TINCTURE 40 BAGS/CARTON 4 CARTONS/CASE C1544: Brand: 3M™ STERI-STRIP™

## (undated) DEVICE — ST CHARLES MAJOR ABDOMINAL PK: Brand: MEDLINE INDUSTRIES, INC.

## (undated) DEVICE — GOWN,AURORA,NONREINFORCED,LARGE: Brand: MEDLINE